# Patient Record
Sex: FEMALE | Race: WHITE | Employment: PART TIME
[De-identification: names, ages, dates, MRNs, and addresses within clinical notes are randomized per-mention and may not be internally consistent; named-entity substitution may affect disease eponyms.]

---
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
## 2019-06-01 ENCOUNTER — NURSE TRIAGE (OUTPATIENT)
Dept: OTHER | Age: 19
End: 2019-06-01

## 2019-06-01 NOTE — TELEPHONE ENCOUNTER
Pt. saw Dr. Belkis Mckeon in office yesterday and was ordered a z-pack, a nasal spray and an inhaler and zyrtec D. Mom states that none of the medication is at their pharmacy but Mom does not know what the nasal spray or inhaler are named. Information is with patient at work. Mom will try to obtain information and call back or have patient call back.      Reason for Disposition   Reason: professional judgment or information in Reference    Protocols used: NO GUIDELINE AVAILABLE-PEDIATRIC-

## 2019-06-01 NOTE — TELEPHONE ENCOUNTER
Mom returns call and asks that z-pack and zyrtec D be ordered for now. Mom was unable to reach child/patient to clarify all medication that was ordered yesterday and diagnosis. Mom states that she was told it had to do with the sinus' and wheezing and may possibly be a sinus infection. Writer speaks to Dr. Ysabel Jarrett who orders 1.) Zyrtec D one PO every day, dispense 30, 1 refill. 2.) Z-pack, dispense 1, use as directed, no refills. 3.) Flonase, 1 squirt each nare 2 times a day, dispense 1, 1 refill. 4.) ProAir Inhaler, 2 puffs Q4-6 hours as needed, dispense 1, 1 refill. Medication called to Casey Villalpando at [facility] on [facility] (ph# [phone]). Drea Huizar states that medication will be ready in 30 minutes. Writer then speaks to Zaida, and informs of all four medications that were ordered and that they will be ready for  in about 30 minutes.

## 2020-03-19 RX ORDER — NORETHINDRONE ACETATE AND ETHINYL ESTRADIOL 1MG-20(21)
KIT ORAL
Qty: 28 TABLET | Refills: 4 | OUTPATIENT
Start: 2020-03-19

## 2020-03-20 RX ORDER — NORETHINDRONE ACETATE AND ETHINYL ESTRADIOL 1MG-20(21)
KIT ORAL
Qty: 28 TABLET | Refills: 1 | Status: SHIPPED | OUTPATIENT
Start: 2020-03-20 | End: 2020-04-21 | Stop reason: SDUPTHER

## 2020-04-21 ENCOUNTER — OFFICE VISIT (OUTPATIENT)
Dept: OBGYN CLINIC | Age: 20
End: 2020-04-21
Payer: COMMERCIAL

## 2020-04-21 VITALS
BODY MASS INDEX: 18.32 KG/M2 | WEIGHT: 114 LBS | DIASTOLIC BLOOD PRESSURE: 72 MMHG | HEIGHT: 66 IN | SYSTOLIC BLOOD PRESSURE: 120 MMHG | TEMPERATURE: 97.6 F

## 2020-04-21 PROCEDURE — 99213 OFFICE O/P EST LOW 20 MIN: CPT | Performed by: NURSE PRACTITIONER

## 2020-04-21 RX ORDER — NORETHINDRONE ACETATE AND ETHINYL ESTRADIOL 1MG-20(21)
KIT ORAL
Qty: 28 TABLET | Refills: 5 | Status: SHIPPED | OUTPATIENT
Start: 2020-04-21 | End: 2020-12-21 | Stop reason: SDUPTHER

## 2020-04-21 NOTE — PROGRESS NOTES
week: Not on file     Minutes per session: Not on file    Stress: Not on file   Relationships    Social connections     Talks on phone: Not on file     Gets together: Not on file     Attends [employer] service: Not on file     Active member of club or organization: Not on file     Attends meetings of clubs or organizations: Not on file     Relationship status: Not on file    Intimate partner violence     Fear of current or ex partner: Not on file     Emotionally abused: Not on file     Physically abused: Not on file     Forced sexual activity: Not on file   Other Topics Concern    Not on file   Social History Narrative    Not on file         MEDICATIONS:  Current Outpatient Medications   Medication Sig Dispense Refill    norethindrone-ethinyl estradiol (BLISOVI FE 1/20) 1-20 MG-MCG per tablet TAKE ONE TABLET BY MOUTH DAILY 28 tablet 5    cetirizine-psuedoephedrine (ZYRTEC-D ALLERGY & CONGESTION) 5-120 MG per extended release tablet Take 1 tablet by mouth 2 times daily       No current facility-administered medications for this visit. ALLERGIES:  Allergies as of 04/21/2020 - Review Complete 04/21/2020   Allergen Reaction Noted    Seasonal  10/09/2014         REVIEW OF SYSTEMS:    yes   A minimum of an eleven point review of systems was completed. Review Of Systems (11 point):  Constitutional: No fever, chills or malaise;  No weight change or fatigue  Head and Eyes: No vision, Headache, Dizziness or trauma in last 12 months  ENT ROS: No hearing, Tinnitis, sinus or taste problems  Hematological and Lymphatic ROS:No Lymphoma, Von Willebrand's, Hemophillia or Bleeding History  Psych ROS: No Depression, Homicidal thoughts,suicidal thoughts, or anxiety  Breast ROS: No prior breast abnormalities or lumps. + bilateral breast lumps  Respiratory ROS: No SOB, Pneumoniae,Cough, or Pulmonary Embolism History  Cardiovascular ROS: No Chest Pain with Exertion, Palpitations, Syncope, Edema, Sensitive       <=4 SUSCEPTIBLEInterpretive criteria when Cefazolin results are used for the therapy of uncomplicated UTIs due to E. coli, K. pneumoniae, and P. mirabilis or to predict the potential effectiveness of oral cephalosporins (eg. Cephalexin) due to E. coli, K. pneumoniae, and P. mirabilis. meropenem Value in next row        <=4 SUSCEPTIBLEInterpretive criteria when Cefazolin results are used for the therapy of uncomplicated UTIs due to E. coli, K. pneumoniae, and P. mirabilis or to predict the potential effectiveness of oral cephalosporins (eg. Cephalexin) due to E. coli, K. pneumoniae, and P. mirabilis. nitrofurantoin Value in next row Sensitive       <=4 SUSCEPTIBLEInterpretive criteria when Cefazolin results are used for the therapy of uncomplicated UTIs due to E. coli, K. pneumoniae, and P. mirabilis or to predict the potential effectiveness of oral cephalosporins (eg. Cephalexin) due to E. coli, K. pneumoniae, and P. mirabilis. tigecycline Value in next row        <=4 SUSCEPTIBLEInterpretive criteria when Cefazolin results are used for the therapy of uncomplicated UTIs due to E. coli, K. pneumoniae, and P. mirabilis or to predict the potential effectiveness of oral cephalosporins (eg. Cephalexin) due to E. coli, K. pneumoniae, and P. mirabilis. tobramycin Value in next row Sensitive       <=4 SUSCEPTIBLEInterpretive criteria when Cefazolin results are used for the therapy of uncomplicated UTIs due to E. coli, K. pneumoniae, and P. mirabilis or to predict the potential effectiveness of oral cephalosporins (eg. Cephalexin) due to E. coli, K. pneumoniae, and P. mirabilis. trimethoprim-sulfamethoxazole Value in next row Sensitive       <=4 SUSCEPTIBLEInterpretive criteria when Cefazolin results are used for the therapy of uncomplicated UTIs due to E. coli, K. pneumoniae, and P. mirabilis or to predict the potential effectiveness of oral cephalosporins (eg.  Cephalexin) due to E. coli, risk of developing a blood clot which may increase her morbidity and or mortality. She was counseled on alternate non hormonal based contraception options. We discussed that smoking and any hormonal based contraception may increase the patients risks of developing these life threatening blood clots. All patients are encouraged to stop smoking at the time of contraceptive counseling. Cessation programs were reviewed. The patient was instructed to use barrier contraception for sexually transmitted disease prevention. The patient was also informed of antibiotics decreasing contraceptive efficacy and the need for barrier contraception from the onset of her antibiotic dosing and through a minimum of thirty days from antibiotic cessation. The life threatening side effect profile was reviewed in detail this includes but is not limited to shortness of breath, chest pain, severe or persistent headaches, or calf pain. If any of these occur the patient has been instructed to stop using her hormonal based contraception, notify the office, and go to the emergency department or call 911. The patient denied any personal history of blood clots in her leg, lung, or heart and denied any family history of stroke, TIA, sudden cardiac death < 36 y.o.,pulmonary embolism, or deep venous thrombosis. PLAN:  Return in about 3 months (around 7/13/2020) for annual.  Bilateral breast ultrasound ordered. Call for results. Patient denies any personal or family history of blood clots or sudden cardiac death prior to age 36. Signs hormonal birth control consent form  Refill for [facility] sent to pharmacy. Repeat Annual every 1 year  Cervical Cytology Evaluation begins at 24years old. If Negative Cytology, Follow-up screening per current guidelines. Return to the office in 12 weeks. Counseled on preventative health maintenance follow-up.   Orders Placed This Encounter   Procedures    US BREAST COMPLETE LEFT     Standing

## 2020-05-06 ENCOUNTER — TELEPHONE (OUTPATIENT)
Dept: OBGYN CLINIC | Age: 20
End: 2020-05-06

## 2020-05-06 DIAGNOSIS — N63.15 BREAST LUMP ON RIGHT SIDE AT 12 O'CLOCK POSITION: ICD-10-CM

## 2020-05-06 DIAGNOSIS — N63.25 BREAST LUMP ON LEFT SIDE AT 12 O'CLOCK POSITION: ICD-10-CM

## 2020-05-06 NOTE — TELEPHONE ENCOUNTER
----- Message from SEUN Chino NP sent at 5/6/2020 11:25 AM EDT -----  No abnormalities seen in either breast  Normal breast exams with annual exams recommended

## 2020-12-21 ENCOUNTER — OFFICE VISIT (OUTPATIENT)
Dept: OBGYN CLINIC | Age: 20
End: 2020-12-21
Payer: COMMERCIAL

## 2020-12-21 VITALS
DIASTOLIC BLOOD PRESSURE: 62 MMHG | SYSTOLIC BLOOD PRESSURE: 104 MMHG | WEIGHT: 117 LBS | TEMPERATURE: 98.2 F | HEIGHT: 66 IN | BODY MASS INDEX: 18.8 KG/M2

## 2020-12-21 PROCEDURE — 99395 PREV VISIT EST AGE 18-39: CPT | Performed by: NURSE PRACTITIONER

## 2020-12-21 RX ORDER — NORETHINDRONE ACETATE AND ETHINYL ESTRADIOL 1MG-20(21)
KIT ORAL
Qty: 28 TABLET | Refills: 12 | Status: SHIPPED | OUTPATIENT
Start: 2020-12-21 | End: 2022-04-07 | Stop reason: SDUPTHER

## 2020-12-21 ASSESSMENT — PATIENT HEALTH QUESTIONNAIRE - PHQ9
SUM OF ALL RESPONSES TO PHQ QUESTIONS 1-9: 0
2. FEELING DOWN, DEPRESSED OR HOPELESS: 0
SUM OF ALL RESPONSES TO PHQ9 QUESTIONS 1 & 2: 0
SUM OF ALL RESPONSES TO PHQ QUESTIONS 1-9: 0
1. LITTLE INTEREST OR PLEASURE IN DOING THINGS: 0
SUM OF ALL RESPONSES TO PHQ QUESTIONS 1-9: 0

## 2020-12-21 NOTE — PROGRESS NOTES
History and Physical  [address], [address] [facility] [address], [facility] [place] [address]. [phone]   Fax [phone]  2020              20 y.o. Chief Complaint   Patient presents with    Gynecologic Exam       No LMP recorded. Primary Care Physician: Best Weeks MD    The patient was seen and examined. She has no chief complaint today and is here for her annual exam.  Her bowels are regular. There are no voiding complaints. She denies any bloating. She denies vaginal discharge and was counseled on STD's and the need for barrier contraception. HPI : Gilma Das is a 23 y.o. female     Annual exam  Stopped OCPs in March. Desires to restart. After stopping OCPs, periods are longer and heavier- coming every month, lasting 4-5 days. 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   ________________________________________________________________________  [facility] History    Para Term  AB Living   0 0 0 0 0 0   SAB TAB Ectopic Molar Multiple Live Births   0 0 0 0 0 0     Past Medical History:   Diagnosis Date    Cancer ([facility] [address]     PAROTID    Exercise-induced asthma     Family history of breast cancer     mgreat aunt    Seasonal allergies                                                                    Past Surgical History:   Procedure Laterality Date    NOSE SURGERY  2019    PAROTIDECTOMY Left 10/9/2014    WISDOM TOOTH EXTRACTION      WISDOM TOOTH EXTRACTION       Family History   Problem Relation Age of Onset    Coronary Art Dis Other         G. Parents    Alcohol Abuse Other         uncle, G. Parent    Heart Disease Paternal Grandfather      Social History     Socioeconomic History    Marital status: Single     Spouse name: Not on file    Number of children: Not on file    Years of education: Not on file    Highest education level: Not on file   Occupational History    Not on file   Social Needs    Financial resource strain: Not on file    Food insecurity     Worry: Not on file     Inability: Not on file    Transportation needs     Medical: Not on file     Non-medical: Not on file   Tobacco Use    Smoking status: Never Smoker    Smokeless tobacco: Never Used   Substance and Sexual Activity    Alcohol use: No    Drug use: No    Sexual activity: Yes     Partners: Male   Lifestyle    Physical activity     Days per week: Not on file     Minutes per session: Not on file    Stress: Not on file   Relationships    Social connections     Talks on phone: Not on file     Gets together: Not on file     Attends Zoroastrianism service: Not on file     Active member of club or organization: Not on file     Attends meetings of clubs or organizations: Not on file     Relationship status: Not on file    Intimate partner violence     Fear of current or ex partner: Not on file     Emotionally abused: Not on file     Physically abused: Not on file     Forced sexual activity: Not on file   Other Topics Concern    Not on file   Social History Narrative    Not on file       MEDICATIONS:  Current Outpatient Medications   Medication Sig Dispense Refill    norethindrone-ethinyl estradiol (BLISOVI FE 1/20) 1-20 MG-MCG per tablet TAKE ONE TABLET BY MOUTH DAILY 28 tablet 12    cetirizine-psuedoephedrine (ZYRTEC-D ALLERGY & CONGESTION) 5-120 MG per extended release tablet Take 1 tablet by mouth 2 times daily       No current facility-administered medications for this visit. ALLERGIES:  Allergies as of 12/21/2020 - Review Complete 12/21/2020   Allergen Reaction Noted    Seasonal  10/09/2014       Symptoms of decreased mood absent  Symptoms of anhedonia absent    **If either question is answered in a  positive fashion then complete the PHQ9 Scoring Evaluation and make the appropriate referral**      Immunization status: stated as current, but no records available.       Gynecologic History:  Menarche: 16yo  Menopause at [age] yo     No LMP recorded. Sexually Active: Yes    STD History: No     Permanent Sterilization: No   Reversible Birth Control: No        Hormone Replacement Exposure: No      Genetic Qualified Family History of Breast, Ovarian , Colon or Uterine Cancer: No     If YES see scanned worksheet. Preventative Health Testing:    Health Maintenance:  Health Maintenance Due   Topic Date Due    Varicella vaccine (1 of 2 - 2-dose childhood series) 01/02/2000    HPV vaccine (1 - 2-dose series) 01/02/2010    HIV screen  01/02/2014    DTaP/Tdap/Td vaccine (1 - Tdap) 01/02/2018    Chlamydia screen  10/15/2019    Cervical cancer screen  01/02/2020    Flu vaccine (1) 09/01/2020       Date of Last Pap Smear: NA  Abnormal Pap Smear History: NA  Colposcopy History:   Date of Last Mammogram: NA  Date of Last Colonoscopy:   Date of Last Bone Density:      ________________________________________________________________________        REVIEW OF SYSTEMS:    yes   A minimum of an eleven point review of systems was completed. Review Of Systems (11 point):  Constitutional: No fever, chills or malaise; No weight change or fatigue  Head and Eyes: No vision, Headache, Dizziness or trauma in last 12 months  ENT ROS: No hearing, Tinnitis, sinus or taste problems  Hematological and Lymphatic ROS:No Lymphoma, Von Willebrand's, Hemophillia or Bleeding History  Psych ROS: No Depression, Homicidal thoughts,suicidal thoughts, or anxiety  Breast ROS: No prior breast abnormalities or lumps  Respiratory ROS: No SOB, Pneumoniae,Cough, or Pulmonary Embolism History  Cardiovascular ROS: No Chest Pain with Exertion, Palpitations, Syncope, Edema, Arrhythmia  Gastrointestinal ROS: No Indigestion, Heartburn, Nausea, vomiting, Diarrhea, Constipation,or Bowel Changes; No Bloody Stools or melena  Genito-Urinary ROS: No Dysuria, Hematuria or Nocturia.  No Urinary Incontinence or Vaginal Discharge  Musculoskeletal ROS: No Arthralgia, Arthritis,Gout,Osteoporosis or Rheumatism  Neurological ROS: No CVA, Migraines, Epilepsy, Seizure Hx, or Limb Weakness  Dermatological ROS: No Rash, Itching, Hives, Mole Changes or Cancer                                                                                                                                                                                                                                  PHYSICAL Exam:     Constitutional:  Vitals:    12/21/20 1259   BP: 104/62   Site: Right Upper Arm   Position: Sitting   Cuff Size: Medium Adult   Temp: 98.2 °F (36.8 °C)   Weight: 117 lb (53.1 kg)   Height: 5' 6\" (1.676 m)       Chaperone for Intimate Exam   Chaperone was offered and accepted as part of the rooming process.  Chaperone: Daralyn Barthel          General Appearance: This  is a well Developed, well Nourished, well groomed female. Her BMI was reviewed. Nutritional decision making was discussed. Skin:  There was a Normal Inspection of the skin without rashes or lesions. There were no rashes. (Papular, Maculopapular, Hives, Pustular, Macular)     There were no lesions (Ulcers, Erythema, Abn. Appearing Nevi)            Lymphatic:  No Lymph Nodes were Palpable in the neck , axilla or groin.  0 # Of Lymph Nodes; Location ; Character [Normal]  [Shotty] [Tender] [Enlarged]     Neck and EENT:  The neck was supple. There were no masses   The thyroid was not enlarged and had no masses. Perrla, EOMI B/L, TMI B/L No Abnormalities. Throat inspected-No exudates or Masses, Nares Patent No Masses        Respiratory: The lungs were auscultated and found to be clear. There were no rales, rhonchi or wheezes. There was a good respiratory effort. Cardiovascular: The heart was in a regular rate and rhythm. . No S3 or S4. There was no murmur appreciated. Location, grade, and radiation are not applicable. Extremities:   The patients extremities were without calf tenderness, edema, or recommendations. 1. Annuals every year; Cytology collections per prevailing guidelines. 2. Mammograms begin every year at 35 yo if no abnormalities are found and no family history. 3. Bone density studies every 2-3 years. Begin at 71 yo. If no fracture history or osteoporosis family history. (significant). 4. Colonoscopy begin at 40 yo. Repeat every ten years if negative and no family history. 5. Calcium of 2062-5667 mg/day in split dosing  6. Vitamin D 400-800 IU/day  7. All other preventative health recommendations will be managed by the patients Primary care physician. Counseling Hormonal Based Birth Control:      The patient was seen and counseled on all forms of birth control both male and female  reversible and non. She is aware that hormonal based birth control may increase her risk of developing a blood clot which may increase her morbidity and or mortality. She was counseled on alternate non hormonal based contraception options. We discussed that smoking and any hormonal based contraception may increase the patients risks of developing these life threatening blood clots. All patients are encouraged to stop smoking at the time of contraceptive counseling. Cessation programs were reviewed. The patient was instructed to use barrier contraception for sexually transmitted disease prevention. The patient was also informed of antibiotics decreasing contraceptive efficacy and the need for barrier contraception from the onset of her antibiotic dosing and through a minimum of thirty days from antibiotic cessation. The life threatening side effect profile was reviewed in detail this includes but is not limited to shortness of breath, chest pain, severe or persistent headaches, or calf pain. If any of these occur the patient has been instructed to stop using her hormonal based contraception, notify the office, and go to the emergency department or call 911.     The patient denied any personal history of blood clots in her leg, lung, or heart and denied any family history of stroke, TIA, sudden cardiac death < 36 y.o.,pulmonary embolism, or deep venous thrombosis. PLAN:  Return in about 1 year (around 12/21/2021) for annual.   Pap smear collected. Patient denies any personal or family history of blood clots or sudden cardiac death prior to age 36. Signs hormonal birth control consent form  Prescription for blisovi 1/20 sent to pharmacy. Repeat Annual every 1 year  Cervical Cytology Evaluation begins at 24years old. If Negative Cytology, Follow-up screening per current guidelines. Mammograms every 1 year. If 37 yo and last mammogram was negative. Calcium and Vitamin D dosing reviewed. Colonoscopy screening reviewed as well as onset for bone density testing. Birth control and barrier recommendations discussed. STD counseling and prevention reviewed. Gardisil counseling completed for all patients 10-37 yo. Routine health maintenance per patients PCP. Orders Placed This Encounter   Procedures    PAP SMEAR     Standing Status:   Future     Standing Expiration Date:   12/17/2021     Order Specific Question:   Collection Type     Answer: Thin Prep     Order Specific Question:   Prior Abnormal Pap Test     Answer:   No     Order Specific Question:   Screening or Diagnostic     Answer:   Screening     Order Specific Question:   HPV Requested?      Answer:   N/A     Order Specific Question:   High Risk Patient     Answer:   N/A

## 2020-12-28 DIAGNOSIS — Z01.419 VISIT FOR GYNECOLOGIC EXAMINATION: ICD-10-CM

## 2022-01-19 ENCOUNTER — OFFICE VISIT (OUTPATIENT)
Dept: OBGYN CLINIC | Age: 22
End: 2022-01-19
Payer: COMMERCIAL

## 2022-01-19 VITALS
SYSTOLIC BLOOD PRESSURE: 94 MMHG | DIASTOLIC BLOOD PRESSURE: 62 MMHG | BODY MASS INDEX: 19.13 KG/M2 | WEIGHT: 119 LBS | HEIGHT: 66 IN

## 2022-01-19 DIAGNOSIS — Z11.3 SCREENING EXAMINATION FOR STD (SEXUALLY TRANSMITTED DISEASE): ICD-10-CM

## 2022-01-19 DIAGNOSIS — Z01.419 VISIT FOR GYNECOLOGIC EXAMINATION: Primary | ICD-10-CM

## 2022-01-19 PROCEDURE — 99395 PREV VISIT EST AGE 18-39: CPT | Performed by: NURSE PRACTITIONER

## 2022-01-19 ASSESSMENT — PATIENT HEALTH QUESTIONNAIRE - PHQ9
SUM OF ALL RESPONSES TO PHQ QUESTIONS 1-9: 0
SUM OF ALL RESPONSES TO PHQ9 QUESTIONS 1 & 2: 0
1. LITTLE INTEREST OR PLEASURE IN DOING THINGS: 0
SUM OF ALL RESPONSES TO PHQ QUESTIONS 1-9: 0
2. FEELING DOWN, DEPRESSED OR HOPELESS: 0

## 2022-01-19 NOTE — PROGRESS NOTES
History and Physical  [address], [address] [facility] [address], [address] [phone]   Fax [phone]  Chelly Bennett  [birth year]22              22 y.o. Chief Complaint   Patient presents with    Gynecologic Exam       Patient's last menstrual period was 2022 (approximate). Primary Care Physician: Ludy Ge MD    The patient was seen and examined. She has no chief complaint today and is here for her annual exam.  Her bowels are regular. There are no voiding complaints. She denies any bloating. She denies vaginal discharge and was counseled on STD's and the need for barrier contraception. HPI : Chelly Bennett is a 20 y.o. female     Annual exam  Stopped blisovi- no longer has a boyfriend. Wants to be off for now, but will call if she changes her mind. Periods occur every month, lasting 3-4 days.   STD screening.  ________________________________________________________________________  OB History    Para Term  AB Living   0 0 0 0 0 0   SAB IAB Ectopic Molar Multiple Live Births   0 0 0 0 0 0     Past Medical History:   Diagnosis Date    Cancer ([facility] [address]     PAROTID    Exercise-induced asthma     Family history of breast cancer     mgreat aunt    Seasonal allergies                                                                    Past Surgical History:   Procedure Laterality Date    NOSE SURGERY  2019    PAROTIDECTOMY Left 10/9/2014    WISDOM TOOTH EXTRACTION      WISDOM TOOTH EXTRACTION       Family History   Problem Relation Age of Onset    Coronary Art Dis Other         G. Parents    Alcohol Abuse Other         uncle, G. Parent    Heart Disease Paternal Grandfather      Social History     Socioeconomic History    Marital status: Single     Spouse name: Not on file    Number of children: Not on file    Years of education: Not on file    Highest education level: Not on file   Occupational History    Not on file   Tobacco Use    Smoking status: Never Smoker    Smokeless tobacco: Never Used   Substance and Sexual Activity    Alcohol use: No    Drug use: No    Sexual activity: Yes     Partners: Male   Other Topics Concern    Not on file   Social History Narrative    Not on file     Social Determinants of Health     Financial Resource Strain:     Difficulty of Paying Living Expenses: Not on file   Food Insecurity:     Worried About Running Out of Food in the Last Year: Not on file    Jinny of Food in the Last Year: Not on file   Transportation Needs:     Lack of Transportation (Medical): Not on file    Lack of Transportation (Non-Medical):  Not on file   Physical Activity:     Days of Exercise per Week: Not on file    Minutes of Exercise per Session: Not on file   Stress:     Feeling of Stress : Not on file   Social Connections:     Frequency of Communication with Friends and Family: Not on file    Frequency of Social Gatherings with Friends and Family: Not on file    Attends Rastafari Services: Not on file    Active Member of [address] [place] [address] or Organizations: Not on file    Attends Club or Organization Meetings: Not on file    Marital Status: Not on file   Intimate Partner Violence:     Fear of Current or Ex-Partner: Not on file    Emotionally Abused: Not on file    Physically Abused: Not on file    Sexually Abused: Not on file   Housing Stability:     Unable to Pay for Housing in the Last Year: Not on file    Number of [place] in the Last Year: Not on file    Unstable Housing in the Last Year: Not on file       MEDICATIONS:  Current Outpatient Medications   Medication Sig Dispense Refill    cetirizine-psuedoephedrine (ZYRTEC-D ALLERGY & CONGESTION) 5-120 MG per extended release tablet Take 1 tablet by mouth 2 times daily       norethindrone-ethinyl estradiol (BLISOVI FE 1/20) 1-20 MG-MCG per tablet TAKE ONE TABLET BY MOUTH DAILY (Patient not taking: Reported on 1/19/2022) 28 tablet 12     No current facility-administered medications for this visit. ALLERGIES:  Allergies as of 01/19/2022 - Fully Reviewed 01/19/2022   Allergen Reaction Noted    Seasonal  10/09/2014       Symptoms of decreased mood absent  Symptoms of anhedonia absent    **If either question is answered in a  positive fashion then complete the PHQ9 Scoring Evaluation and make the appropriate referral**      Immunization status: stated as current, but no records available. Gynecologic History:  Menarche: 4 yo  Menopause at [address] [place] [place] yo     Patient's last menstrual period was 01/12/2022 (approximate). Sexually Active: Yes    STD History: No     Permanent Sterilization: No   Reversible Birth Control: Yes OCPs        Hormone Replacement Exposure: No      Genetic Qualified Family History of Breast, Ovarian , Colon or Uterine Cancer: No     If YES see scanned worksheet. Preventative Health Testing:    Health Maintenance:  Health Maintenance Due   Topic Date Due    Hepatitis C screen  Never done    Varicella vaccine (1 of 2 - 2-dose childhood series) Never done    COVID-19 Vaccine (1) Never done    HPV vaccine (1 - 2-dose series) Never done    Depression Screen  Never done    HIV screen  Never done    DTaP/Tdap/Td vaccine (1 - Tdap) Never done    Chlamydia screen  10/15/2019    Flu vaccine (1) 09/01/2021       Date of Last Pap Smear: 12/28/2020 neg  Abnormal Pap Smear History: denies  Colposcopy History:   Date of Last Mammogram: NA  Date of Last Colonoscopy:   Date of Last Bone Density:      ________________________________________________________________________        REVIEW OF SYSTEMS:    yes   A minimum of an eleven point review of systems was completed. Review Of Systems (11 point):  Constitutional: No fever, chills or malaise;  No weight change or fatigue  Head and Eyes: No vision changes, Headache, Dizziness or trauma in last 12 months  ENT ROS: No hearing, Tinnitis, sinus or taste problems  Hematological and Lymphatic ROS:No Lymphoma, Von Willebrand's, Hemophillia or Bleeding History  Psych ROS: No Depression, Homicidal thoughts,suicidal thoughts, or anxiety  Breast ROS: No breast abnormalities or lumps  Respiratory ROS: No SOB, Pneumoniae,Cough, or Pulmonary Embolism   Cardiovascular ROS: No Chest Pain with Exertion, Palpitations, Syncope, Edema, Arrhythmia  Gastrointestinal ROS: No Indigestion, Heartburn, Nausea, vomiting, Diarrhea, Constipation,or Bowel Changes; No Bloody Stools or melena  Genito-Urinary ROS: No Dysuria, Hematuria or Nocturia. No Urinary Incontinence or Vaginal Discharge  Musculoskeletal ROS: No Arthralgia, Arthritis,Gout,Osteoporosis or Rheumatism  Neurological ROS: No CVA, Migraines, Epilepsy, Seizure Hx, or Limb Weakness  Dermatological ROS: No Rash, Itching, Hives, Mole Changes or Cancer                                                                                                                                                                                                                                  PHYSICAL Exam:     Constitutional:  Vitals:    01/19/22 1408   BP: 94/62   Site: Right Upper Arm   Position: Sitting   Cuff Size: Medium Adult   Weight: 119 lb (54 kg)   Height: 5' 6\" (1.676 m)       Chaperone for Intimate Exam   Chaperone was offered and accepted as part of the rooming process.  Chaperone: Jose          General Appearance: This  is a well Developed, well Nourished, well groomed female. Her BMI was reviewed. Nutritional decision making was discussed. Skin:  There was a Normal Inspection of the skin without rashes or lesions. There were no rashes. (Papular, Maculopapular, Hives, Pustular, Macular)     There were no lesions (Ulcers, Erythema, Abn. Appearing Nevi)            Lymphatic:  No Lymph Nodes were Palpable in the neck , axilla or groin.  0 # Of Lymph Nodes; Location ; Character [Normal]  [Shotty] [Tender] [Enlarged]     Neck and EENT:  The neck was supple. There were no masses   The thyroid was not enlarged and had no masses. Perrla, EOMI B/L, TMI B/L No Abnormalities. Throat inspected-No exudates or Masses, Nares Patent No Masses        Respiratory: The lungs were auscultated and found to be clear. There were no rales, rhonchi or wheezes. There was a good respiratory effort. Cardiovascular: The heart was in a regular rate and rhythm. . No S3 or S4. There was no murmur appreciated. Location, grade, and radiation are not applicable. Extremities: The patients extremities were without calf tenderness, edema, or varicosities. There was full range of motion in all four extremities. Pulses in all four extremities were appreciated and are 2/4. Abdomen: The abdomen was soft and non-tender. There were good bowel sounds in all quadrants and there was no guarding, rebound or rigidity. On evaluation there was no evidence of hepatosplenomegaly and there was no costal vertebral ash tenderness bilaterally. No hernias were appreciated. Abdominal Scars: intact    Psych: The patient had a normal Orientation to: Time, Place, Person, and Situation  There is no Mood / Affect changes    Breast:  (Chest)  normal appearance, no masses or tenderness  Self breast exams were reviewed in detail. Literature was given. Pelvic Exam:  External genitalia: normal general appearance  Urinary system: urethral meatus normal  Vaginal: normal mucosa without prolapse or lesions  Cervix: normal appearance  Adnexa: normal bimanual exam  Uterus: normal single, nontender    Rectal Exam:  exam declined by patient          Musculosk:  Normal Gait and station was noted. Digits were evaluated without abnormal findings. Range of motion, stability and strength were evaluated and found to be appropriate for the patients age. ASSESSMENT:      20 y.o. Annual   Diagnosis Orders   1. Visit for gynecologic examination  PAP SMEAR   2.  Screening examination for STD (sexually transmitted disease)  C.trachomatis N.gonorrhoeae DNA    VAGINITIS DNA PROBE    Hepatitis C Antibody    Hepatitis B Surface Antigen    HIV Screen    T. pallidum Ab    HERPES PROFILE          Chief Complaint   Patient presents with    Gynecologic Exam          Past Medical History:   Diagnosis Date    Cancer ([address]     PAROTID    Exercise-induced asthma     Family history of breast cancer     mgreat aunt    Seasonal allergies          Patient Active Problem List    Diagnosis Date Noted    Family history of breast cancer      mgreat aunt            Hereditary Breast, Ovarian, Colon and Uterine Cancer screening Done. Tobacco & Secondary smoke risks reviewed; instructed on cessation and avoidance      Counseling Completed:  Preventative Health Recommendations and Follow up. The patient was informed of the recommended preventative health recommendations. 1. Annuals every year; Cytology collections per prevailing guidelines. 2. Mammograms begin every year at 37 yo if no abnormalities are found and no family history. 3. Bone density studies every 2-3 years. Begin at 73 yo. If no fracture history or osteoporosis family history. (significant). 4. Colonoscopy begin at 40 yo. Repeat every ten years if negative and no family history. 5. Calcium of 8152-7705 mg/day in split dosing  6. Vitamin D 400-800 IU/day  7. All other preventative health recommendations will be managed by the patients Primary care physician. Counseling Hormonal Based Birth Control:      The patient was seen and counseled on all forms of birth control both male and female  reversible and non. She is aware that hormonal based birth control may increase her risk of developing a blood clot which may increase her morbidity and or mortality. She was counseled on alternate non hormonal based contraception options.   We discussed that smoking and any hormonal based contraception may increase the patients risks of developing these life threatening blood clots. All patients are encouraged to stop smoking at the time of contraceptive counseling. Cessation programs were reviewed. The patient was instructed to use barrier contraception for sexually transmitted disease prevention. The patient was also informed of antibiotics decreasing contraceptive efficacy and the need for barrier contraception from the onset of her antibiotic dosing and through a minimum of thirty days from antibiotic cessation. The life threatening side effect profile was reviewed in detail this includes but is not limited to shortness of breath, chest pain, severe or persistent headaches, or calf pain. If any of these occur the patient has been instructed to stop using her hormonal based contraception, notify the office, and go to the emergency department or call 911. The patient denied any personal history of blood clots in her leg, lung, or heart and denied any family history of stroke, TIA, sudden cardiac death < 36 y.o.,pulmonary embolism, or deep venous thrombosis. PLAN:  Return in about 1 year (around 1/19/2023) for annual.   Pap smear and vaginal cultures collected. Lab slips given for STD screening. Signs hormone consent form. Declines refill on Blisovi currently, will call office if she changes her mind. Repeat Annual every 1 year  Cervical Cytology Evaluation begins at 24years old. If Negative Cytology, Follow-up screening per current guidelines. Mammograms every 1 year. If 35 yo and last mammogram was negative. Calcium and Vitamin D dosing reviewed. Colonoscopy screening reviewed as well as onset for bone density testing. Birth control and barrier recommendations discussed. STD counseling and prevention reviewed. Gardisil counseling completed for all patients 10-35 yo. Routine health maintenance per patients PCP.   Orders Placed This Encounter   Procedures    C.trachomatis N.gonorrhoeae DNA     Standing Status:   Future     Standing Expiration Date: 1/19/2023    VAGINITIS DNA PROBE     Standing Status:   Future     Standing Expiration Date:   1/19/2023    PAP SMEAR     Patient History:    Patient's last menstrual period was 01/12/2022 (approximate). OBGYN Status: Having periods  Past Surgical History:  12/2019: NOSE SURGERY  10/9/2014: Chinyere Come; Left  No date: WISDOM TOOTH EXTRACTION  No date: WISDOM TOOTH EXTRACTION  Medications/Contraceptives Affecting Cytology     Combination Contraceptives - Oral Disp Start End     norethindrone-ethinyl estradiol (BLISOVI FE 1/20) 1-20 MG-MCG per tablet      28 tablet 12/21/2020     Sig: TAKE ONE TABLET BY MOUTH DAILY    Patient not taking: Reported on 1/19/2022       Notes to Pharmacy: Patient will need to call the office to get an annual   exam scheduled. Social History    Tobacco Use      Smoking status: Never Smoker      Smokeless tobacco: Never Used       Standing Status:   Future     Standing Expiration Date:   1/19/2023     Order Specific Question:   Collection Type     Answer: Thin Prep     Order Specific Question:   Prior Abnormal Pap Test     Answer:   No     Order Specific Question:   Screening or Diagnostic     Answer:   Screening     Order Specific Question:   HPV Requested? Answer:   N/A     Order Specific Question:   High Risk Patient     Answer:   N/A    Hepatitis C Antibody     Standing Status:   Future     Standing Expiration Date:   1/19/2023    Hepatitis B Surface Antigen     Standing Status:   Future     Standing Expiration Date:   1/19/2023    HIV Screen     Standing Status:   Future     Standing Expiration Date:   1/19/2023    T. pallidum Ab     Standing Status:   Future     Standing Expiration Date:   2/18/2022    HERPES PROFILE     Standing Status:   Future     Standing Expiration Date:   2/18/2022           The patient, Christin Perrin is a 20 y.o. female, was seen with a total time spent of 20 minutes for the visit on this date of service by the E/M provider.  The time component had both face to face and non face to face time spent in determining the total time component. Counseling and education regarding her diagnosis listed below and her options regarding those diagnoses were also included in determining her time component. Diagnosis Orders   1. Visit for gynecologic examination  PAP SMEAR   2. Screening examination for STD (sexually transmitted disease)  C.trachomatis N.gonorrhoeae DNA    VAGINITIS DNA PROBE    Hepatitis C Antibody    Hepatitis B Surface Antigen    HIV Screen    T. pallidum Ab    HERPES PROFILE        The patient had her preventative health maintenance recommendations and follow-up reviewed with her at the completion of her visit.

## 2022-01-20 DIAGNOSIS — Z11.3 SCREENING EXAMINATION FOR STD (SEXUALLY TRANSMITTED DISEASE): ICD-10-CM

## 2022-01-21 ENCOUNTER — TELEPHONE (OUTPATIENT)
Dept: OBGYN CLINIC | Age: 22
End: 2022-01-21

## 2022-01-21 RX ORDER — FLUCONAZOLE 150 MG/1
150 TABLET ORAL ONCE
Qty: 2 TABLET | Refills: 0 | Status: SHIPPED | OUTPATIENT
Start: 2022-01-21 | End: 2022-01-21

## 2022-01-21 RX ORDER — METRONIDAZOLE 500 MG/1
500 TABLET ORAL 2 TIMES DAILY
Qty: 14 TABLET | Refills: 0 | Status: SHIPPED | OUTPATIENT
Start: 2022-01-21 | End: 2022-01-28

## 2022-01-21 NOTE — TELEPHONE ENCOUNTER
----- Message from SEUN iGraldo NP sent at 1/20/2022  4:47 PM EST -----  + BV  Flagyl 500mg PO BID x 7 days   + yeast  Diflucan 150 mg PO x 1  repeat in 7 days

## 2022-01-25 ENCOUNTER — TELEPHONE (OUTPATIENT)
Dept: OBGYN CLINIC | Age: 22
End: 2022-01-25

## 2022-01-25 DIAGNOSIS — Z01.419 VISIT FOR GYNECOLOGIC EXAMINATION: ICD-10-CM

## 2022-01-25 NOTE — TELEPHONE ENCOUNTER
----- Message from SEUN Bee - CNP sent at 1/25/2022  9:29 AM EST -----  Pap smear ASCUS  AGE 22  Repeat in 1 year  Please let patient know results. + candida- diflucan 150mg PO X 1 now and repeat in 7 days  Can you please check on the GC/ chlamydia? I am not seeing the results.

## 2022-01-25 NOTE — TELEPHONE ENCOUNTER
Per Benson Alfred NP, pt notified of ASCUS on pap with recommendation to repeat in 1yr. Pt completed diflucan and is still taking flagyl for +BV. Pt verbalized understanding.

## 2022-01-27 DIAGNOSIS — Z11.3 SCREENING EXAMINATION FOR STD (SEXUALLY TRANSMITTED DISEASE): ICD-10-CM

## 2022-04-07 DIAGNOSIS — N94.6 DYSMENORRHEA: ICD-10-CM

## 2022-04-07 RX ORDER — NORETHINDRONE ACETATE AND ETHINYL ESTRADIOL 1MG-20(21)
KIT ORAL
Qty: 28 TABLET | Refills: 12 | Status: SHIPPED | OUTPATIENT
Start: 2022-04-07

## 2022-04-07 RX ORDER — NORETHINDRONE ACETATE AND ETHINYL ESTRADIOL 1MG-20(21)
KIT ORAL
Qty: 28 TABLET | Refills: 12 | Status: CANCELLED | OUTPATIENT
Start: 2022-04-07

## 2022-04-07 NOTE — TELEPHONE ENCOUNTER
Pt called in she saw nenita in Jan. Pt had declined bc fill at time but was told if she decided to start it to call office , pt wants to start now .  Please call into [facility]

## 2022-12-14 ENCOUNTER — TELEPHONE (OUTPATIENT)
Dept: OBGYN CLINIC | Age: 22
End: 2022-12-14

## 2022-12-14 RX ORDER — FLUCONAZOLE 150 MG/1
150 TABLET ORAL ONCE
Qty: 2 TABLET | Refills: 0 | Status: SHIPPED | OUTPATIENT
Start: 2022-12-14 | End: 2022-12-14

## 2022-12-14 NOTE — TELEPHONE ENCOUNTER
Pt called w symptoms of yeast , she did buy stuff OTC but she thinks now she needs a pill , sh eis update on Annual she had it inJ an 2021 , please call into [address]

## 2022-12-21 ENCOUNTER — OFFICE VISIT (OUTPATIENT)
Dept: OBGYN CLINIC | Age: 22
End: 2022-12-21
Payer: COMMERCIAL

## 2022-12-21 VITALS — DIASTOLIC BLOOD PRESSURE: 64 MMHG | HEIGHT: 67 IN | SYSTOLIC BLOOD PRESSURE: 108 MMHG | BODY MASS INDEX: 18.64 KG/M2

## 2022-12-21 DIAGNOSIS — N94.6 DYSMENORRHEA: ICD-10-CM

## 2022-12-21 DIAGNOSIS — N89.8 VAGINAL DISCHARGE: Primary | ICD-10-CM

## 2022-12-21 PROBLEM — C80.1 MALIGNANT NEOPLASM (HCC): Status: ACTIVE | Noted: 2022-04-06

## 2022-12-21 PROBLEM — F41.1 GENERALIZED ANXIETY DISORDER: Status: ACTIVE | Noted: 2022-04-05

## 2022-12-21 PROBLEM — E55.9 VITAMIN D DEFICIENCY: Status: ACTIVE | Noted: 2022-04-06

## 2022-12-21 PROBLEM — E61.1 IRON DEFICIENCY: Status: ACTIVE | Noted: 2022-04-12

## 2022-12-21 PROBLEM — K90.9 IRON MALABSORPTION: Status: ACTIVE | Noted: 2022-04-12

## 2022-12-21 PROCEDURE — 99213 OFFICE O/P EST LOW 20 MIN: CPT | Performed by: NURSE PRACTITIONER

## 2022-12-21 RX ORDER — PANTOPRAZOLE SODIUM 20 MG/1
20 TABLET, DELAYED RELEASE ORAL DAILY
COMMUNITY
Start: 2022-11-14

## 2022-12-21 RX ORDER — LEVOCETIRIZINE DIHYDROCHLORIDE 5 MG/1
5 TABLET, FILM COATED ORAL DAILY
COMMUNITY
Start: 2022-09-08

## 2022-12-21 RX ORDER — NORETHINDRONE ACETATE AND ETHINYL ESTRADIOL 1MG-20(21)
KIT ORAL
Qty: 28 TABLET | Refills: 2 | Status: SHIPPED | OUTPATIENT
Start: 2022-12-21

## 2022-12-21 RX ORDER — BUSPIRONE HYDROCHLORIDE 7.5 MG/1
7.5 TABLET ORAL 2 TIMES DAILY
COMMUNITY
Start: 2022-11-28

## 2022-12-21 RX ORDER — FLUCONAZOLE 150 MG/1
150 TABLET ORAL ONCE
Qty: 1 TABLET | Refills: 0 | Status: SHIPPED | OUTPATIENT
Start: 2022-12-21 | End: 2022-12-21

## 2022-12-21 NOTE — PROGRESS NOTES
Mila Pallas  2022    YOB: 2000          The patient was seen today. She is here regarding states she restarted her blisovi on her own a couple months after her annual as she already had refills. Did not have a menses from may to September on OCP. Desires to continue on OCP. States she would like an menses every month now that she is sexually active again but feels stable on this OCP. Desires ot continue on OCP at this time. Recently txted for yeast by urgent care- deres x 1 more dose of diflucan. Her bowels are regular and she is voiding without difficulty.      HPI:  Mila Pallas is a 20 y.o. female  dysmenorrhea and vaginal irritation       OB History    Para Term  AB Living   0 0 0 0 0 0   SAB IAB Ectopic Molar Multiple Live Births   0 0 0 0 0 0       Past Medical History:   Diagnosis Date    Cancer ([facility] [address]     PAROTID    Exercise-induced asthma     Family history of breast cancer     mgreat aunt    Generalized anxiety disorder 2022    Seasonal allergies        Past Surgical History:   Procedure Laterality Date    NOSE SURGERY  2019    PAROTIDECTOMY Left 10/9/2014    WISDOM TOOTH EXTRACTION      WISDOM TOOTH EXTRACTION         Family History   Problem Relation Age of Onset    Coronary Art Dis Other         G. Parents    Alcohol Abuse Other         uncle, G. Parent    Heart Disease Paternal Grandfather        Social History     Socioeconomic History    Marital status: Single     Spouse name: Not on file    Number of children: Not on file    Years of education: Not on file    Highest education level: Not on file   Occupational History    Not on file   Tobacco Use    Smoking status: Never    Smokeless tobacco: Never   Substance and Sexual Activity    Alcohol use: No    Drug use: No    Sexual activity: Yes     Partners: Male   Other Topics Concern    Not on file   Social History Narrative    Not on file     Social Determinants of Health     Financial Resource Strain: Not on file   Food Insecurity: Not on file   Transportation Needs: Not on file   Physical Activity: Not on file   Stress: Not on file   Social Connections: Not on file   Intimate Partner Violence: Not on file   Housing Stability: Not on file         MEDICATIONS:  Current Outpatient Medications   Medication Sig Dispense Refill    busPIRone (BUSPAR) 7.5 MG tablet Take 7.5 mg by mouth 2 times daily      Cholecalciferol 50 MCG (2000 UT) CAPS Take 2,000 Units by mouth daily      levocetirizine (XYZAL) 5 MG tablet Take 5 mg by mouth daily      pantoprazole (PROTONIX) 20 MG tablet Take 20 mg by mouth daily      norethindrone-ethinyl estradiol (BLISOVI FE 1/20) 1-20 MG-MCG per tablet TAKE ONE TABLET BY MOUTH DAILY 28 tablet 2    fluconazole (DIFLUCAN) 150 MG tablet Take 1 tablet by mouth once for 1 dose 1 tablet 0    cetirizine-psuedoephedrine (ZYRTEC-D) 5-120 MG per extended release tablet Take 1 tablet by mouth 2 times daily        No current facility-administered medications for this visit. ALLERGIES:  Allergies as of 12/21/2022 - Fully Reviewed 01/19/2022   Allergen Reaction Noted    Seasonal  10/09/2014         REVIEW OF SYSTEMS:    see problem list   A minimum of an eleven point review of systems was completed. Review Of Systems (11 point):  Constitutional: No fever, chills or malaise;  No weight change or fatigue  Head and Eyes: No vision, Headache, Dizziness or trauma in last 12 months  ENT ROS: No hearing, Tinnitis, sinus or taste problems  Hematological and Lymphatic ROS:No Lymphoma, Von Willebrand's, Hemophillia or Bleeding History  Psych ROS: No Depression, Homicidal thoughts,suicidal thoughts, or anxiety  Breast ROS: No prior breast abnormalities or lumps  Respiratory ROS: No SOB, Pneumoniae,Cough, or Pulmonary Embolism History  Cardiovascular ROS: No Chest Pain with Exertion, Palpitations, Syncope, Edema, Arrhythmia  Gastrointestinal ROS: No Indigestion, Heartburn, Nausea, vomiting, Diarrhea, Constipation,or Bowel Changes; No Bloody Stools or melena  Genito-Urinary ROS: No Dysuria, Hematuria or Nocturia. No Urinary Incontinence or Vaginal Discharge  Musculoskeletal ROS: No Arthralgia, Arthritis,Gout,Osteoporosis or Rheumatism  Neurological ROS: No CVA, Migraines, Epilepsy, Seizure Hx, or Limb Weakness  Dermatological ROS: No Rash, Itching, Hives, Mole Changes or Cancer          Blood pressure 108/64, height 5' 7\" (1.702 m), not currently breastfeeding. Chaperone for Intimate Exam  Chaperone was offered and accepted as part of the rooming process. Chaperone: NA         Abdomen: Soft non-tender; good bowel sounds. No guarding, rebound or rigidity. No CVA tenderness bilaterally. Extremities: No calf tenderness, DTR 2/4, and No edema bilaterally    Pelvic: Exam deferred. Diagnostics:  No results found. Lab Results:  Results for orders placed or performed during the hospital encounter of 01/12/17   Urine culture clean catch   Result Value Ref Range    Specimen Description       . CLEAN CATCH URINE Performed at [facility]: [address]    Specimen Description  [address] [place], [address] [place] [address] [phone]     Special Requests NOT REPORTED     Culture ESCHERICHIA COLI 10 to 50,000 CFU/ML (A)     Culture       Performed at [facility] [id number] [place] [address], [address] [place] [address] [phone]    Status FINAL 01/14/2017     Organism EC        Susceptibility    Escherichia coli - MACEY     amikacin NOT REPORTED       ampicillin <=2 SUSCEPTIBLE Sensitive      ampicillin-sulbactam NOT REPORTED       aztreonam <=1 SUSCEPTIBLE Sensitive      ceFAZolin Value in next row Sensitive       <=4 SUSCEPTIBLEInterpretive criteria when Cefazolin results are used for the therapy of uncomplicated UTIs due to E. coli, K. pneumoniae, and P. mirabilis or to predict the potential effectiveness of oral cephalosporins (eg. Cephalexin) due to E. coli, K. pneumoniae, and P. mirabilis.      cefepime Value in next row        <=4 SUSCEPTIBLEInterpretive criteria when Cefazolin results are used for the therapy of uncomplicated UTIs due to E. coli, K. pneumoniae, and P. mirabilis or to predict the potential effectiveness of oral cephalosporins (eg. Cephalexin) due to E. coli, K. pneumoniae, and P. mirabilis. cefTRIAXone Value in next row Sensitive       <=4 SUSCEPTIBLEInterpretive criteria when Cefazolin results are used for the therapy of uncomplicated UTIs due to E. coli, K. pneumoniae, and P. mirabilis or to predict the potential effectiveness of oral cephalosporins (eg. Cephalexin) due to E. coli, K. pneumoniae, and P. mirabilis. ciprofloxacin Value in next row Sensitive       <=4 SUSCEPTIBLEInterpretive criteria when Cefazolin results are used for the therapy of uncomplicated UTIs due to E. coli, K. pneumoniae, and P. mirabilis or to predict the potential effectiveness of oral cephalosporins (eg. Cephalexin) due to E. coli, K. pneumoniae, and P. mirabilis. ertapenem Value in next row        <=4 SUSCEPTIBLEInterpretive criteria when Cefazolin results are used for the therapy of uncomplicated UTIs due to E. coli, K. pneumoniae, and P. mirabilis or to predict the potential effectiveness of oral cephalosporins (eg. Cephalexin) due to E. coli, K. pneumoniae, and P. mirabilis. Confirmatory Extended Spectrum Beta-Lactamase Value in next row Sensitive       <=4 SUSCEPTIBLEInterpretive criteria when Cefazolin results are used for the therapy of uncomplicated UTIs due to E. coli, K. pneumoniae, and P. mirabilis or to predict the potential effectiveness of oral cephalosporins (eg. Cephalexin) due to E. coli, K. pneumoniae, and P. mirabilis.      gentamicin Value in next row Sensitive       <=4 SUSCEPTIBLEInterpretive criteria when Cefazolin results are used for the therapy of uncomplicated UTIs due to E. coli, K. pneumoniae, and P. mirabilis or to predict the potential effectiveness of oral cephalosporins (eg. Cephalexin) due to E. coli, K. pneumoniae, and P. mirabilis. meropenem Value in next row        <=4 SUSCEPTIBLEInterpretive criteria when Cefazolin results are used for the therapy of uncomplicated UTIs due to E. coli, K. pneumoniae, and P. mirabilis or to predict the potential effectiveness of oral cephalosporins (eg. Cephalexin) due to E. coli, K. pneumoniae, and P. mirabilis. nitrofurantoin Value in next row Sensitive       <=4 SUSCEPTIBLEInterpretive criteria when Cefazolin results are used for the therapy of uncomplicated UTIs due to E. coli, K. pneumoniae, and P. mirabilis or to predict the potential effectiveness of oral cephalosporins (eg. Cephalexin) due to E. coli, K. pneumoniae, and P. mirabilis. tigecycline Value in next row        <=4 SUSCEPTIBLEInterpretive criteria when Cefazolin results are used for the therapy of uncomplicated UTIs due to E. coli, K. pneumoniae, and P. mirabilis or to predict the potential effectiveness of oral cephalosporins (eg. Cephalexin) due to E. coli, K. pneumoniae, and P. mirabilis. tobramycin Value in next row Sensitive       <=4 SUSCEPTIBLEInterpretive criteria when Cefazolin results are used for the therapy of uncomplicated UTIs due to E. coli, K. pneumoniae, and P. mirabilis or to predict the potential effectiveness of oral cephalosporins (eg. Cephalexin) due to E. coli, K. pneumoniae, and P. mirabilis. trimethoprim-sulfamethoxazole Value in next row Sensitive       <=4 SUSCEPTIBLEInterpretive criteria when Cefazolin results are used for the therapy of uncomplicated UTIs due to E. coli, K. pneumoniae, and P. mirabilis or to predict the potential effectiveness of oral cephalosporins (eg. Cephalexin) due to E. coli, K. pneumoniae, and P. mirabilis.      piperacillin-tazobactam Value in next row Sensitive       <=4 SUSCEPTIBLEInterpretive criteria when Cefazolin results are used for the therapy of uncomplicated UTIs due to E. coli, K. pneumoniae, and P. mirabilis or to predict the potential effectiveness of oral cephalosporins (eg. Cephalexin) due to E. coli, K. pneumoniae, and P. mirabilis. UA W/REFLEX CULTURE   Result Value Ref Range    Color, UA DARK YELLOW (A) YEL    Turbidity UA CLEAR CLEAR    Glucose, Ur NEGATIVE NEG    Bilirubin Urine NEGATIVE NEG    Ketones, Urine NEGATIVE NEG    Specific Henefer, UA 1.028 1.000 - 1.030    Urine Hgb LARGE AMOUNT (A) NEG    pH, UA 6.5 5.0 - 8.0    Protein, UA 2+ (A) NEG    Urobilinogen, Urine Normal NORM    Nitrite, Urine NEGATIVE NEG    Leukocyte Esterase, Urine SMALL AMOUNT (A) NEG    Urinalysis Comments NOT REPORTED    Microscopic Urinalysis   Result Value Ref Range    -          WBC, UA 20 TO 50 /HPF    RBC, UA 20 TO 50 /HPF    Casts UA NOT REPORTED 0 - 2 /LPF    Crystals, UA NOT REPORTED NONE /HPF    Epithelial Cells UA 0 TO 2 /HPF    Renal Epithelial, UA NOT REPORTED 0 /HPF    Bacteria, UA FEW (A) NONE    Mucus, UA 1+ (A) NONE    Trichomonas, UA NOT REPORTED NONE    Amorphous, UA NOT REPORTED NONE    Other Observations UA NOT REPORTED NREQ    Yeast, UA NOT REPORTED NONE         Assessment:   Diagnosis Orders   1. Vaginal discharge        2.  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  Formatting of this note might be different from the original.  carcinoma paradid gland    Last Assessment & Plan:   Formatting of this note is different from the original.  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 She is aware that hormonal based birth control may increase her risk of developing a blood clot which may increase her morbidity and or mortality. She was counseled on alternate non hormonal based contraception options. We discussed that smoking and any hormonal based contraception may increase the patients risks of developing these life threatening blood clots. All patients are encouraged to stop smoking at the time of contraceptive counseling. Cessation programs were reviewed. The patient was instructed to use barrier contraception for sexually transmitted disease prevention. The patient was also informed of antibiotics decreasing contraceptive efficacy and the need for barrier contraception from the onset of her antibiotic dosing and through a minimum of thirty days from antibiotic cessation. The life threatening side effect profile was reviewed in detail this includes but is not limited to shortness of breath, chest pain, severe or persistent headaches, or calf pain. If any of these occur the patient has been instructed to stop using her hormonal based contraception, notify the office, and go to the emergency department or call 911. The patient denied any personal history of blood clots in her leg, lung, or heart and denied any family history of stroke, TIA, sudden cardiac death < 36 y.o.,pulmonary embolism, or deep venous thrombosis. PLAN:  Return in about 2 months (around 2/21/2023) for annual.  Continue on OCP  Rx OCP and diflucan   Repeat Annual every 1 year  Cervical Cytology Evaluation begins at 24years old. If Negative Cytology, Follow-up screening per current guidelines. Return to the office in 8 weeks. Counseled on preventative health maintenance follow-up.   No orders of the defined types were placed in this encounter. The patient, Carmen Montenegro is a 20 y.o. female, was seen with a total time spent of 20 minutes for the visit on this date of service by the E/M provider. The time component had both face to face and non face to face time spent in determining the total time component. Counseling and education regarding her diagnosis listed below and her options regarding those diagnoses were also included in determining her time component. Diagnosis Orders   1. Vaginal discharge        2. Dysmenorrhea  norethindrone-ethinyl estradiol (BLISOVI FE 1/20) 1-20 MG-MCG per tablet           The patient had her preventative health maintenance recommendations and follow-up reviewed with her at the completion of her visit.

## 2023-04-25 DIAGNOSIS — N94.6 DYSMENORRHEA: ICD-10-CM

## 2023-04-25 RX ORDER — NORETHINDRONE ACETATE AND ETHINYL ESTRADIOL 1MG-20(21)
KIT ORAL
Qty: 28 TABLET | Refills: 0 | Status: SHIPPED | OUTPATIENT
Start: 2023-04-25

## 2023-04-25 NOTE — TELEPHONE ENCOUNTER
Requesting refill on birth control Annual 05/08/23 to new Pharm in [facility].  [facility].

## 2023-05-08 ENCOUNTER — OFFICE VISIT (OUTPATIENT)
Dept: OBGYN CLINIC | Age: 23
End: 2023-05-08
Payer: COMMERCIAL

## 2023-05-08 VITALS
WEIGHT: 141 LBS | BODY MASS INDEX: 22.13 KG/M2 | DIASTOLIC BLOOD PRESSURE: 78 MMHG | SYSTOLIC BLOOD PRESSURE: 112 MMHG | HEIGHT: 67 IN

## 2023-05-08 DIAGNOSIS — N89.8 VAGINAL DISCHARGE: ICD-10-CM

## 2023-05-08 DIAGNOSIS — Z01.419 WELL FEMALE EXAM WITH ROUTINE GYNECOLOGICAL EXAM: Primary | ICD-10-CM

## 2023-05-08 DIAGNOSIS — N94.6 DYSMENORRHEA: ICD-10-CM

## 2023-05-08 PROCEDURE — 99395 PREV VISIT EST AGE 18-39: CPT | Performed by: NURSE PRACTITIONER

## 2023-05-08 RX ORDER — NORETHINDRONE ACETATE AND ETHINYL ESTRADIOL 1MG-20(21)
KIT ORAL
Qty: 28 TABLET | Refills: 12 | Status: SHIPPED | OUTPATIENT
Start: 2023-05-08

## 2023-05-08 NOTE — PROGRESS NOTES
History and Physical  [address], [address] [facility] [address], [address] [facility] [phone]   Fax [phone]  Hallie Narayan  2023              23 y.o. Chief Complaint   Patient presents with    Annual Exam       Patient's last menstrual period was 2023 (exact date). Primary Care Physician: Deborah Perry MD    The patient was seen and examined. She has no chief complaint today and is here for her annual exam.  Her bowels are regular. There are no voiding complaints. She denies any bloating. She denies vaginal discharge and was counseled on STD's and the need for barrier contraception. HPI : Hallie Narayan is a 24 y.o. female Kaz Mariah    Annual exam  Complaining of weight gain of 20 pounds in past year. Started a new job in [employer] since January. Since finished college, not as active- completed masters in December at [employer]. Concerned weight gain may be because of birth control pill. Discussed different options, including changing lower dose birth control- LoLoestrin Fe 1\/10.    Patient desires to continue on same birth control pill for now.      ________________________________________________________________________  OB History    Para Term  AB Living   0 0 0 0 0 0   SAB IAB Ectopic Molar Multiple Live Births   0 0 0 0 0 0     Past Medical History:   Diagnosis Date    Cancer ([facility] [address]     PAROTID    Exercise-induced asthma     Family history of breast cancer     mgreat aunt    Generalized anxiety disorder 2022    Seasonal allergies                                                                    Past Surgical History:   Procedure Laterality Date    NOSE SURGERY  2019    PAROTIDECTOMY Left 10/9/2014    WISDOM TOOTH EXTRACTION      WISDOM TOOTH EXTRACTION       Family History   Problem Relation Age of Onset    Coronary Art Dis Other         G. Parents    Alcohol Abuse Other         uncle, G. Parent    Heart Disease Paternal

## 2023-05-10 DIAGNOSIS — N89.8 VAGINAL DISCHARGE: ICD-10-CM

## 2023-05-10 DIAGNOSIS — Z01.419 WELL FEMALE EXAM WITH ROUTINE GYNECOLOGICAL EXAM: ICD-10-CM

## 2023-05-24 DIAGNOSIS — N94.6 DYSMENORRHEA: ICD-10-CM

## 2023-05-24 RX ORDER — NORETHINDRONE ACETATE AND ETHINYL ESTRADIOL 1MG-20(21)
KIT ORAL
Qty: 28 TABLET | Refills: 12 | Status: SHIPPED | OUTPATIENT
Start: 2023-05-24

## 2023-11-07 SDOH — HEALTH STABILITY: PHYSICAL HEALTH: ON AVERAGE, HOW MANY DAYS PER WEEK DO YOU ENGAGE IN MODERATE TO STRENUOUS EXERCISE (LIKE A BRISK WALK)?: 1 DAY

## 2023-11-07 SDOH — HEALTH STABILITY: PHYSICAL HEALTH: ON AVERAGE, HOW MANY MINUTES DO YOU ENGAGE IN EXERCISE AT THIS LEVEL?: 20 MIN

## 2023-11-08 ENCOUNTER — OFFICE VISIT (OUTPATIENT)
Dept: FAMILY MEDICINE CLINIC | Age: 23
End: 2023-11-08
Payer: COMMERCIAL

## 2023-11-08 VITALS
HEART RATE: 92 BPM | RESPIRATION RATE: 16 BRPM | HEIGHT: 67 IN | OXYGEN SATURATION: 98 % | DIASTOLIC BLOOD PRESSURE: 72 MMHG | SYSTOLIC BLOOD PRESSURE: 104 MMHG | WEIGHT: 149 LBS | BODY MASS INDEX: 23.39 KG/M2

## 2023-11-08 DIAGNOSIS — Z76.89 ENCOUNTER TO ESTABLISH CARE: ICD-10-CM

## 2023-11-08 DIAGNOSIS — E55.9 VITAMIN D DEFICIENCY: ICD-10-CM

## 2023-11-08 DIAGNOSIS — R23.9 CHANGES IN SKIN OF BOTH BREASTS: ICD-10-CM

## 2023-11-08 DIAGNOSIS — R06.83 SNORING: Primary | ICD-10-CM

## 2023-11-08 DIAGNOSIS — F41.1 GENERALIZED ANXIETY DISORDER: ICD-10-CM

## 2023-11-08 DIAGNOSIS — C80.1 MALIGNANT NEOPLASM (HCC): ICD-10-CM

## 2023-11-08 DIAGNOSIS — D50.8 IRON DEFICIENCY ANEMIA SECONDARY TO INADEQUATE DIETARY IRON INTAKE: ICD-10-CM

## 2023-11-08 PROCEDURE — 99204 OFFICE O/P NEW MOD 45 MIN: CPT | Performed by: REGISTERED NURSE

## 2023-11-08 RX ORDER — FLUTICASONE PROPIONATE 50 MCG
2 SPRAY, SUSPENSION (ML) NASAL DAILY
COMMUNITY
Start: 2023-10-13

## 2023-11-08 RX ORDER — M-VIT,TX,IRON,MINS/CALC/FOLIC 27MG-0.4MG
1 TABLET ORAL DAILY
COMMUNITY

## 2023-11-08 ASSESSMENT — PATIENT HEALTH QUESTIONNAIRE - PHQ9
1. LITTLE INTEREST OR PLEASURE IN DOING THINGS: 0
SUM OF ALL RESPONSES TO PHQ QUESTIONS 1-9: 0
2. FEELING DOWN, DEPRESSED OR HOPELESS: 0
SUM OF ALL RESPONSES TO PHQ QUESTIONS 1-9: 0
SUM OF ALL RESPONSES TO PHQ9 QUESTIONS 1 & 2: 0

## 2023-11-08 ASSESSMENT — ENCOUNTER SYMPTOMS
SHORTNESS OF BREATH: 0
EYES NEGATIVE: 1
GASTROINTESTINAL NEGATIVE: 1
COLOR CHANGE: 1

## 2023-11-08 NOTE — PROGRESS NOTES
Patient: Gail Blanchard is a 24 y.o. female who presents today with the following Chief Complaint(s):  Chief Complaint   Patient presents with    New Patient     Mole on the right side of neck, and discoloration on breasts       Assessment/Plan:    1. Changes in skin of both breasts  Changes in skin of both breasts is consistent with stretch marks. She has had an increase in her breast size of approximately 2 cups over the past 1 to 2 years. Areas of concern are smooth and she is not experiencing any other symptoms or changes of her breasts. Breast exam is normal.  Discussed signs and symptoms to monitor for and she agrees to follow-up if she notes any changes. Continue regular breast exams. 2. Snoring  She reports worsening snoring. She has had sinus surgery previously-snoring improved after this procedure. Recently her snoring has worsened. She is interested in pursuing a sleep study. Referral given. - [address], NP, Sleep Medicine, [facility] [place] [facility]    3. Malignant neoplasm ([address])  History of parotid/salivary gland cancer. She was cleared from oncology and no longer has regular follow-up. 4. Generalized anxiety disorder  Stable. Well managed with BuSpar. Follows up with psychiatrist regularly. 5. Vitamin D deficiency  Last vitamin D level from June 2023 was normal.    6. Iron deficiency anemia secondary to inadequate dietary iron intake  Last CBC and iron and ferritin levels were in normal range per labs from June 2023.    7. Encounter to establish care  Patient's past medical history, surgical history, family history, medications,  and allergies were all reviewed and updated as appropriate today. Return in about 7 months (around 6/8/2024) for Annual check up. HPI:     She is here to establish care. She has a history of     Anxiety- sees a psychiatrist. Rakesh Amos. Asthma- she had a breathing evaluation and it was determined she does not have asthma.      Cancer- parotid tumor at age

## 2024-10-03 ASSESSMENT — PATIENT HEALTH QUESTIONNAIRE - PHQ9
SUM OF ALL RESPONSES TO PHQ9 QUESTIONS 1 & 2: 0
SUM OF ALL RESPONSES TO PHQ QUESTIONS 1-9: 0
SUM OF ALL RESPONSES TO PHQ QUESTIONS 1-9: 0
2. FEELING DOWN, DEPRESSED OR HOPELESS: NOT AT ALL
1. LITTLE INTEREST OR PLEASURE IN DOING THINGS: NOT AT ALL
SUM OF ALL RESPONSES TO PHQ9 QUESTIONS 1 & 2: 0
SUM OF ALL RESPONSES TO PHQ QUESTIONS 1-9: 0
1. LITTLE INTEREST OR PLEASURE IN DOING THINGS: NOT AT ALL
2. FEELING DOWN, DEPRESSED OR HOPELESS: NOT AT ALL
SUM OF ALL RESPONSES TO PHQ QUESTIONS 1-9: 0

## 2024-10-04 ENCOUNTER — OFFICE VISIT (OUTPATIENT)
Dept: FAMILY MEDICINE CLINIC | Age: 24
End: 2024-10-04
Payer: COMMERCIAL

## 2024-10-04 VITALS
SYSTOLIC BLOOD PRESSURE: 88 MMHG | HEART RATE: 97 BPM | DIASTOLIC BLOOD PRESSURE: 68 MMHG | OXYGEN SATURATION: 98 % | WEIGHT: 148 LBS | BODY MASS INDEX: 23.17 KG/M2

## 2024-10-04 DIAGNOSIS — F41.1 GENERALIZED ANXIETY DISORDER: Primary | ICD-10-CM

## 2024-10-04 DIAGNOSIS — F42.2 MIXED OBSESSIONAL THOUGHTS AND ACTS: ICD-10-CM

## 2024-10-04 DIAGNOSIS — R19.5 LOOSE STOOLS: ICD-10-CM

## 2024-10-04 DIAGNOSIS — R11.0 NAUSEA: ICD-10-CM

## 2024-10-04 PROCEDURE — 99214 OFFICE O/P EST MOD 30 MIN: CPT | Performed by: REGISTERED NURSE

## 2024-10-04 RX ORDER — BUSPIRONE HYDROCHLORIDE 10 MG/1
10 TABLET ORAL 2 TIMES DAILY
Status: SHIPPED | COMMUNITY
Start: 2024-10-04

## 2024-10-04 RX ORDER — FLUOXETINE 10 MG/1
10 CAPSULE ORAL DAILY
Qty: 30 CAPSULE | Refills: 3 | Status: SHIPPED | OUTPATIENT
Start: 2024-10-04

## 2024-10-04 SDOH — ECONOMIC STABILITY: FOOD INSECURITY: WITHIN THE PAST 12 MONTHS, YOU WORRIED THAT YOUR FOOD WOULD RUN OUT BEFORE YOU GOT MONEY TO BUY MORE.: NEVER TRUE

## 2024-10-04 SDOH — ECONOMIC STABILITY: FOOD INSECURITY: WITHIN THE PAST 12 MONTHS, THE FOOD YOU BOUGHT JUST DIDN'T LAST AND YOU DIDN'T HAVE MONEY TO GET MORE.: NEVER TRUE

## 2024-10-04 SDOH — ECONOMIC STABILITY: INCOME INSECURITY: HOW HARD IS IT FOR YOU TO PAY FOR THE VERY BASICS LIKE FOOD, HOUSING, MEDICAL CARE, AND HEATING?: NOT HARD AT ALL

## 2024-10-04 ASSESSMENT — ENCOUNTER SYMPTOMS
VOMITING: 0
NAUSEA: 1
RESPIRATORY NEGATIVE: 1
ABDOMINAL PAIN: 0
DIARRHEA: 0
CONSTIPATION: 0
BLOOD IN STOOL: 0
SHORTNESS OF BREATH: 0

## 2024-10-04 NOTE — PROGRESS NOTES
Patient: Guicho Abel is a 24 y.o. female who presents today with the following Chief Complaint(s):  Chief Complaint   Patient presents with    Annual Exam       Assessment/Plan:    1. Generalized anxiety disorder  Suspect loose stools and nausea are symptoms of uncontrolled anxiety/OCD.  Symptoms only occur in the evenings when her anxiety is high due to her fears related to nighttime.  Plan as for her to develop an emergency plan with her boyfriend and write this down so that she has a plan for what to do in the case of an emergency.  Start progressive muscle relaxation and breathing exercises nightly.  She no longer has a psychiatrist or therapist.  Plan is to start Prozac 10 mg daily.  Continue BuSpar twice a day as needed.  Referred to behavioral health consultants for support.  Follow-up in 1 month for reevaluation and physical exam.  - FLUoxetine (PROZAC) 10 MG capsule; Take 1 capsule by mouth daily  Dispense: 30 capsule; Refill: 3    2. Mixed obsessional thoughts and acts  See note above.   - FLUoxetine (PROZAC) 10 MG capsule; Take 1 capsule by mouth daily  Dispense: 30 capsule; Refill: 3    3. Loose stools  Take probiotic daily.     4. Nausea  Suspect nausea may be related to some acid reflux.  Recommend Pepcid daily for 14 days.  Avoid any triggering foods.  Reducing anxiety should help with symptoms as well.    Return in about 4 weeks (around 11/1/2024), or if symptoms worsen or fail to improve, for Annual check up, Medication check.    HPI:     She has had an upset stomach and looser stools and some nausea for a few weeks, only in the evenings.  No abdominal pain, no blood in her stool.  No vomiting or fevers.    She has been diagnosed with OCD. She thinks this may be causing some of her symptoms. She is very anxious at night and this is when her loose stools and nausea occur.    She does not have nausea or diarrhea during the day. She feels great during the day and feels like she is able to relax in

## 2024-10-04 NOTE — PATIENT INSTRUCTIONS
Develop an emergency plan with Dav and write this down.     Progressive muscle relaxation and breathing exercises.     Pepcid nightly for 14 days.    Please make an appointment with one of our Behavioral Health Consultants, Dr. Calix, Emily Landaverde, or Hailey Pacheco. They will work with you to develop a plan to improve your overall well-being by addressing any behavioral or mental health factors that are influencing your health. You can schedule your appointment just like you schedule your other appointments here, at the  or over the phone. Each appointment will be 30 minutes long, and you will typically be seen every 2-4 weeks. Your insurance should cover the visit, but you may owe a specialist copay. If you would prefer to be seen by a therapist more frequently, or for a longer visit, please ask your Primary Care Provider for a recommendation.   Call [phone] Option 3

## 2024-11-01 ENCOUNTER — OFFICE VISIT (OUTPATIENT)
Dept: FAMILY MEDICINE CLINIC | Age: 24
End: 2024-11-01
Payer: COMMERCIAL

## 2024-11-01 VITALS
BODY MASS INDEX: 23.02 KG/M2 | OXYGEN SATURATION: 97 % | SYSTOLIC BLOOD PRESSURE: 96 MMHG | WEIGHT: 147 LBS | HEART RATE: 85 BPM | DIASTOLIC BLOOD PRESSURE: 68 MMHG

## 2024-11-01 DIAGNOSIS — F41.1 GENERALIZED ANXIETY DISORDER: Primary | ICD-10-CM

## 2024-11-01 DIAGNOSIS — Z85.818 HISTORY OF PAROTID CANCER: ICD-10-CM

## 2024-11-01 DIAGNOSIS — F42.2 MIXED OBSESSIONAL THOUGHTS AND ACTS: ICD-10-CM

## 2024-11-01 PROCEDURE — 99214 OFFICE O/P EST MOD 30 MIN: CPT | Performed by: REGISTERED NURSE

## 2024-11-01 ASSESSMENT — ENCOUNTER SYMPTOMS
RESPIRATORY NEGATIVE: 1
DIARRHEA: 0
VOMITING: 0
NAUSEA: 1

## 2024-11-01 NOTE — PATIENT INSTRUCTIONS
Plan is to start Prozac. Buspar for breakthrough anxiety.    Contact [facility] for therapy.     Schedule with ENT.

## 2024-11-01 NOTE — PROGRESS NOTES
Patient: Guicho Abel is a 24 y.o. 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 Generalized anxiety disorder  She feels that her nausea is related to her anxiety.  Discussed the benefits of medication in the setting of anxiety.  She expresses desire to start medication for her symptoms.  Provided support and encouragement for self-care.  Educated on the benefits of starting medication while she is dealing with anxiety, OCD and external stressors.  Referral to a therapist for support and skills.  Reviewed risk/benefits/potential side effects of Prozac.  She has prescription already.  Follow-up in 2 months or as needed.  - Jaymie Leong LISW-S, Counseling, [place]    2. Mixed obsessional thoughts and acts  Referred for therapy for assistance in managing OCD symptoms.  - Jaymie Leong LISW-S, Counsel[facility], [place]    3. History of parotid cancer  Referred to ENT for evaluation due to history of parotid cancer at age 14.  - Ayala Powers DO, Otolaryngology, [place]      Return in about 2 months (around 1/1/2025), or if symptoms worsen or fail to improve, for Medication check.    HPI:     She is here to follow up about her anxiety and nausea. She still thinks her nausea is due to her anxiety.     She has not started Przac because she has been waiting for things to calm down-she was traveling and then moved.  She has been waiting until things are not so busy she is concerned that if she starts medication when other things are going on she will know how effective the medication is.  She also has some concerns about what friends/family think about being on medication.    She says her boyfriend is worried that her nausea is 9 of cancer.  She had parotid cancer at the age of 14.  She believes  [Sudden] : sudden [7] : 7 [5] : 5 [Dull/Aching] : dull/aching [Localized] : localized [Constant] : constant [Household chores] : household chores [Sleep] : sleep [Rest] : rest [Sitting] : sitting [de-identified] : 5/6/22: patient fell on right TKA about a week ago and has had increased pain in the knee since. She had severe swelling and got at XR at [place]. Also fell on left wrist and presents with distal wrist pain and swelling. Has large bruise and scrape on knee as well.  \par \par Previous doc:\par Left knee and hip pain x 9 months, no specific injury. No prior left knee or hip issues. Right TKA 2014 by Dr. Byers at [facility] still with pain.\par 1/29/19: F/U MRI left knee.\par 2/26 overall better but still has pain daily with normal activity\par 4/2/19: Left knee doing well since inj. Right TKA cont pain.\par 2/4/20: Return of left knee pain, inj in Aug helped.\par 8/31/20: Inj in Feb helped for a few months, pain returning in left knee lately. Right knee unchanged.\par 4/30/21: Inj helped last summer but pain has been slowly returning.\par 10/29/21: Inj helped but pain has returned. [] : no [FreeTextEntry1] : bryan knee [FreeTextEntry5] : patient fell outside store

## 2024-11-19 ENCOUNTER — OFFICE VISIT (OUTPATIENT)
Dept: ENT CLINIC | Age: 24
End: 2024-11-19
Payer: COMMERCIAL

## 2024-11-19 VITALS
HEART RATE: 73 BPM | SYSTOLIC BLOOD PRESSURE: 115 MMHG | HEIGHT: 67 IN | WEIGHT: 147 LBS | BODY MASS INDEX: 23.07 KG/M2 | DIASTOLIC BLOOD PRESSURE: 81 MMHG

## 2024-11-19 DIAGNOSIS — R13.10 DYSPHAGIA, UNSPECIFIED TYPE: Primary | ICD-10-CM

## 2024-11-19 DIAGNOSIS — Z85.9 H/O MALIGNANT NEOPLASM: ICD-10-CM

## 2024-11-19 DIAGNOSIS — Z92.3 HISTORY OF HEAD AND NECK RADIATION: ICD-10-CM

## 2024-11-19 PROCEDURE — 99203 OFFICE O/P NEW LOW 30 MIN: CPT | Performed by: OTOLARYNGOLOGY

## 2024-11-19 ASSESSMENT — ENCOUNTER SYMPTOMS
SORE THROAT: 0
COUGH: 0
FACIAL SWELLING: 0
EYE ITCHING: 0
VOICE CHANGE: 0
APNEA: 0
SINUS PRESSURE: 0
SHORTNESS OF BREATH: 0
TROUBLE SWALLOWING: 1

## 2024-11-19 NOTE — PROGRESS NOTES
(THERAPEUTIC MULTIVITAMIN-MINERALS) tablet Take 1 tablet by mouth daily      norethindrone-ethinyl estradiol (BLISOVI FE 1/20) 1-20 MG-MCG per tablet TAKE 1 TABLET BY MOUTH DAILY 28 tablet 12    levocetirizine (XYZAL) 5 MG tablet Take 1 tablet by mouth daily      busPIRone (BUSPAR) 10 MG tablet Take 1 tablet by mouth 2 times daily (Patient not taking: Reported on 11/19/2024)      fluticasone (FLONASE) 50 MCG/ACT nasal spray 2 sprays by Each Nostril route daily Shake liquid (Patient not taking: Reported on 11/19/2024)      Probiotic Product (PROBIOTIC DAILY PO) Take by mouth (Patient not taking: Reported on 11/19/2024)      pantoprazole (PROTONIX) 20 MG tablet Take 1 tablet by mouth daily (Patient not taking: Reported on 11/19/2024)       No current facility-administered medications for this visit.       Review of Systems     Review of Systems   Constitutional:  Negative for appetite change, chills, fatigue, fever and unexpected weight change.   HENT:  Positive for postnasal drip and trouble swallowing. Negative for congestion, ear discharge, ear pain, facial swelling, hearing loss, nosebleeds, sinus pressure, sneezing, sore throat, tinnitus and voice change.    Eyes:  Negative for itching.   Respiratory:  Negative for apnea, cough and shortness of breath.    Endocrine: Negative for cold intolerance and heat intolerance.   Musculoskeletal:  Negative for myalgias and neck pain.   Skin:  Negative for rash.   Allergic/Immunologic: Negative for environmental allergies.   Neurological:  Negative for dizziness and headaches.   Psychiatric/Behavioral:  Negative for confusion, decreased concentration and sleep disturbance.          PhysicalExam     Vitals:    11/19/24 0933   BP: 115/81   Pulse: 73   Weight: 66.7 kg (147 lb)   Height: 1.702 m (5' 7\")       Physical Exam  Constitutional:       General: She is not in acute distress.     Appearance: She is well-developed.   HENT:      Head: Normocephalic and atraumatic.

## 2024-12-03 ENCOUNTER — PROCEDURE VISIT (OUTPATIENT)
Dept: SPEECH THERAPY | Age: 24
End: 2024-12-03
Payer: COMMERCIAL

## 2024-12-03 DIAGNOSIS — Z92.3 HISTORY OF HEAD AND NECK RADIATION: ICD-10-CM

## 2024-12-03 DIAGNOSIS — R13.10 DYSPHAGIA, UNSPECIFIED TYPE: Primary | ICD-10-CM

## 2024-12-03 DIAGNOSIS — K21.9 LARYNGOPHARYNGEAL REFLUX (LPR): ICD-10-CM

## 2024-12-03 PROCEDURE — 92612 ENDOSCOPY SWALLOW (FEES) VID: CPT | Performed by: SPEECH-LANGUAGE PATHOLOGIST

## 2024-12-03 PROCEDURE — 92526 ORAL FUNCTION THERAPY: CPT | Performed by: SPEECH-LANGUAGE PATHOLOGIST

## 2024-12-03 NOTE — PROGRESS NOTES
[facility] Ear, Nose, and Throat  SPEECH THERAPY  Fiberoptic Endoscopic Evaluation of Swallowing  (FEES)/Treatment      Name: Guicho Abel  YOB: 2000  Gender: female  MRN: [id number]  Referring Physician: Dr. 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 Wilson, 11/19/24:  \"History mucoepidermoid carcinoma unknown grade 10 years ago NITZA  Plan for fees\"     Patient Complaints/Reason for Referral:  Guicho Abel was referred for a FEES to assess the efficiency of his/her swallow function, assess for aspiration, and to make recommendations regarding safe dietary consistencies, effective compensatory strategies, and safe eating environment.    BACKGROUND HISTORY:   PMHx of mucoepidermoid carcinoma of L parotid gland ~10 yrs ago; treated via total parotidectomy + adjuvant XRT. Gradual progression of dysphagia w/ initial observation <5 yrs prior; characterized as sensation of bolus \"globbing up\" and/or sticking in chest, specifically w/ solids (breaded + dry items) and medications (including gel capsules). Results in chest tightness/pain at level of bolus; attempts compensations of liquid wash + coughing/regurgitation. Occasionally able to regurgitate bolus however, most often expectorates saliva/mucus. Sensation typically clears after 20-30 sec, but has had episodes last >10 min; able to breathe + talk and denied sensation of airway obstruction. Denied difficulties related to liquids and/or s/s of aspiration. Long-standing hx of allergies + sinonasal symptoms; bilateral turbinate

## 2025-01-30 ENCOUNTER — OFFICE VISIT (OUTPATIENT)
Dept: FAMILY MEDICINE CLINIC | Age: 25
End: 2025-01-30

## 2025-01-30 VITALS
WEIGHT: 147 LBS | HEART RATE: 82 BPM | SYSTOLIC BLOOD PRESSURE: 112 MMHG | OXYGEN SATURATION: 98 % | BODY MASS INDEX: 23.02 KG/M2 | DIASTOLIC BLOOD PRESSURE: 74 MMHG

## 2025-01-30 DIAGNOSIS — B36.0 TINEA VERSICOLOR: Primary | ICD-10-CM

## 2025-01-30 RX ORDER — SELENIUM SULFIDE 22.5 MG/ML
SHAMPOO TOPICAL
Qty: 180 ML | Refills: 1 | Status: SHIPPED | OUTPATIENT
Start: 2025-01-30

## 2025-01-30 SDOH — ECONOMIC STABILITY: FOOD INSECURITY: WITHIN THE PAST 12 MONTHS, THE FOOD YOU BOUGHT JUST DIDN'T LAST AND YOU DIDN'T HAVE MONEY TO GET MORE.: NEVER TRUE

## 2025-01-30 SDOH — ECONOMIC STABILITY: INCOME INSECURITY: IN THE LAST 12 MONTHS, WAS THERE A TIME WHEN YOU WERE NOT ABLE TO PAY THE MORTGAGE OR RENT ON TIME?: NO

## 2025-01-30 SDOH — ECONOMIC STABILITY: TRANSPORTATION INSECURITY
IN THE PAST 12 MONTHS, HAS THE LACK OF TRANSPORTATION KEPT YOU FROM MEDICAL APPOINTMENTS OR FROM GETTING MEDICATIONS?: NO

## 2025-01-30 SDOH — ECONOMIC STABILITY: FOOD INSECURITY: WITHIN THE PAST 12 MONTHS, YOU WORRIED THAT YOUR FOOD WOULD RUN OUT BEFORE YOU GOT MONEY TO BUY MORE.: NEVER TRUE

## 2025-01-30 SDOH — ECONOMIC STABILITY: TRANSPORTATION INSECURITY
IN THE PAST 12 MONTHS, HAS LACK OF TRANSPORTATION KEPT YOU FROM MEETINGS, WORK, OR FROM GETTING THINGS NEEDED FOR DAILY LIVING?: NO

## 2025-01-30 ASSESSMENT — PATIENT HEALTH QUESTIONNAIRE - PHQ9
1. LITTLE INTEREST OR PLEASURE IN DOING THINGS: NOT AT ALL
2. FEELING DOWN, DEPRESSED OR HOPELESS: NOT AT ALL
SUM OF ALL RESPONSES TO PHQ QUESTIONS 1-9: 0
SUM OF ALL RESPONSES TO PHQ QUESTIONS 1-9: 0
1. LITTLE INTEREST OR PLEASURE IN DOING THINGS: NOT AT ALL
SUM OF ALL RESPONSES TO PHQ QUESTIONS 1-9: 0
SUM OF ALL RESPONSES TO PHQ QUESTIONS 1-9: 0
2. FEELING DOWN, DEPRESSED OR HOPELESS: NOT AT ALL
SUM OF ALL RESPONSES TO PHQ9 QUESTIONS 1 & 2: 0
SUM OF ALL RESPONSES TO PHQ9 QUESTIONS 1 & 2: 0

## 2025-01-30 ASSESSMENT — ENCOUNTER SYMPTOMS
RESPIRATORY NEGATIVE: 1
COLOR CHANGE: 1
GASTROINTESTINAL NEGATIVE: 1

## 2025-01-30 NOTE — PROGRESS NOTES
Patient: Guicho Abel is a 25 y.o. female who presents today with the following Chief Complaint(s):  Chief Complaint   Patient presents with    Skin Problem     Near vaginal area       Assessment/Plan:    Assessment & Plan  1. Tinea versicolor.  The patient presents with a discolored spot on her vulva, which appeared about a week before [place]. The spot is not bleeding, itchy, or painful, and there is no abnormal vaginal discharge. Upon examination, the diagnosis of tinea versicolor was made. She is advised to use selenium sulfide shampoo (Head and Shoulders) to wash the affected area. She should apply the shampoo, leave it on for a few minutes, and then rinse it off. This regimen should be followed for 1 to 2 weeks until the spot resolves. Subsequently, she should continue using the shampoo a few times a week to prevent recurrence. If the condition does not improve, she should inform the clinic for a potential escalation of treatment.      1. Tinea versicolor  - Selenium Sulfide 2.25 % SHAM; Wash with shampoo daily.  Dispense: 180 mL; Refill: 1      Return if symptoms worsen or fail to improve.    HPI:     History of Present Illness  The patient is a 25-year-old female who presents for evaluation of a spot on her vulva.    She first observed the spot approximately one week prior to Patricia(a little over 1 month ago). The spot is characterized by discoloration, forming a perfect [place], without any associated bleeding or scabbing. She reports no pain or itching in the area. There has been no increase in vaginal discharge beyond her usual amount. She maintains personal hygiene through shaving. Additionally, she reports no abdominal pain and has not experienced any similar symptoms under her breasts during the summer season.        Current Outpatient Medications   Medication Sig Dispense Refill    Selenium Sulfide 2.25 % SHAM Wash with shampoo daily. 180 mL 1    Multiple Vitamins-Minerals (THERAPEUTIC

## 2025-03-26 ENCOUNTER — OFFICE VISIT (OUTPATIENT)
Dept: FAMILY MEDICINE CLINIC | Age: 25
End: 2025-03-26

## 2025-03-26 VITALS
DIASTOLIC BLOOD PRESSURE: 60 MMHG | OXYGEN SATURATION: 98 % | HEART RATE: 90 BPM | WEIGHT: 150 LBS | SYSTOLIC BLOOD PRESSURE: 98 MMHG | BODY MASS INDEX: 23.49 KG/M2

## 2025-03-26 DIAGNOSIS — F42.2 MIXED OBSESSIONAL THOUGHTS AND ACTS: ICD-10-CM

## 2025-03-26 DIAGNOSIS — N94.6 DYSMENORRHEA: Primary | ICD-10-CM

## 2025-03-26 DIAGNOSIS — F41.1 GENERALIZED ANXIETY DISORDER: ICD-10-CM

## 2025-03-26 DIAGNOSIS — Z79.899 MEDICATION MANAGEMENT: ICD-10-CM

## 2025-03-26 DIAGNOSIS — C80.1 MALIGNANT NEOPLASM (HCC): ICD-10-CM

## 2025-03-26 RX ORDER — NORETHINDRONE ACETATE AND ETHINYL ESTRADIOL 1MG-20(21)
KIT ORAL
Qty: 28 TABLET | Refills: 12 | Status: SHIPPED | OUTPATIENT
Start: 2025-03-26

## 2025-03-26 ASSESSMENT — ENCOUNTER SYMPTOMS
GASTROINTESTINAL NEGATIVE: 1
RESPIRATORY NEGATIVE: 1
EYES NEGATIVE: 1

## 2025-03-26 NOTE — PROGRESS NOTES
Patient: Guicho Abel is a 25 y.o. female who presents today with the following Chief Complaint(s):  Chief Complaint   Patient presents with    Contraception       Assessment/Plan:    Assessment & Plan  1. Contraception management.  - She has been on Blisovi for a long time and reports occasional missed periods, which is a known side effect of the medication.  - A prescription for Blisovi will be sent to [facility] in [place].  - She mentioned that [facility] provides a medication with the same makeup as Blisovi but under a different name.  - The prescription will be reordered to ensure continuity of contraception until her gynecologist appointment on 04/29/2025.    2. Anxiety.  - She plans to restart fluoxetine after her therapist appointment on 05/07/2025 to coordinate therapy and medication management.  - She experienced a lightheaded reaction previously, which she attributed to fluoxetine and external stressors.  - The potential benefits and drawbacks of using an [place] ring for anxiety management were discussed, with concerns about its impact on her OCD.  - Breathing exercises and progressive muscle relaxation techniques were recommended to help manage anxiety.    3. Medication management.  - She requested to discontinue pantoprazole, selenium sulfide, BuSpar, and Flonase as she no longer uses them.  - She will continue taking Xyzal daily.  - She expressed interest in restarting her probiotic and daily vitamin regimen.  - Her medication list will be updated accordingly.    4. Neoplasm of the parotid gland.  - She had a neoplasm of the parotid gland approximately 10 years ago.  - Recent ENT checkup indicated everything is fine.  - Annual reminders will be set to ensure ongoing monitoring of her condition.  - She will continue regular follow-ups with her ENT specialist.       1. Dysmenorrhea  - norethindrone-ethinyl estradiol (BLISOVI FE 1/20) 1-20 MG-MCG per tablet; TAKE 1 TABLET BY MOUTH DAILY  Dispense: 28 tablet;

## 2025-05-13 ENCOUNTER — OFFICE VISIT (OUTPATIENT)
Dept: FAMILY MEDICINE CLINIC | Age: 25
End: 2025-05-13

## 2025-05-13 VITALS
HEART RATE: 100 BPM | OXYGEN SATURATION: 98 % | SYSTOLIC BLOOD PRESSURE: 116 MMHG | WEIGHT: 159.2 LBS | HEIGHT: 67 IN | DIASTOLIC BLOOD PRESSURE: 64 MMHG | BODY MASS INDEX: 24.99 KG/M2

## 2025-05-13 DIAGNOSIS — R45.0 JITTERY FEELING: ICD-10-CM

## 2025-05-13 DIAGNOSIS — R53.81 MALAISE: Primary | ICD-10-CM

## 2025-05-13 DIAGNOSIS — R00.0 TACHYCARDIA: ICD-10-CM

## 2025-05-13 DIAGNOSIS — R42 DIZZINESS: ICD-10-CM

## 2025-05-13 DIAGNOSIS — L23.7 POISON IVY: ICD-10-CM

## 2025-05-13 DIAGNOSIS — N94.6 DYSMENORRHEA: ICD-10-CM

## 2025-05-13 RX ORDER — NORETHINDRONE ACETATE AND ETHINYL ESTRADIOL 1MG-20(21)
KIT ORAL
Qty: 28 TABLET | Refills: 12 | Status: SHIPPED | OUTPATIENT
Start: 2025-05-13

## 2025-05-13 ASSESSMENT — ENCOUNTER SYMPTOMS
GASTROINTESTINAL NEGATIVE: 1
EYES NEGATIVE: 1
SHORTNESS OF BREATH: 0

## 2025-05-13 NOTE — TELEPHONE ENCOUNTER
Last Office Visit  -  5/13/25  Next Office Visit  -  5/23/25    Last Filled  -  3/26/25  
[facility] is requesting a rx for norethindrone-ethinyl estradiol (BLISOVI FE 1/20) 1-20 MG-MCG per tablet .    Last OV 5/13/25    Next office visit   Visit date not found          
None

## 2025-05-13 NOTE — PROGRESS NOTES
Patient: Guicho Abel is a 25 y.o. female who presents today with the following Chief Complaint(s):  Chief Complaint   Patient presents with    Other     Not feeling well/ bug bite        Assessment/Plan:    Assessment & Plan    1. Dizziness/Malaise/Urinary tract infection.  - Symptoms include dizziness or vertigo and increased frequency of bowel movements, suspected to be due to nitrofurantoin.  - Rapid heart rate (100 bpm) could be attributed to the medication and caffeine intake.  - Encouraged her to decrease caffeine intake.  Stay hydrated.  - She will discontinue nitrofurantoin and monitor her condition. If her urinary symptoms have completely resolved, she does not need to take another dose of nitrofurantoin. If her condition does not improve, she will contact us.    2. Poison ivy.  - The lesion on her ankle appears to be consistent with poison ivy rather than a bug bite.  - Physical exam shows a small, itchy vesicle-like lesion.  - She can apply Zanfel and clear calamine lotion to alleviate the itching.    Follow-up  - The patient will follow up in 2 weeks.    1. Malaise  Suspect malaise, due to her feeling of dizziness is due to the nitrofurantoin.  She plans stop this medication.  She will follow-up in approximately 2 weeks for a physical exam and reevaluation.  She agrees to call for any issues or concerns.  See notes above.  2. Jittery feeling  3. Dizziness  4. Poison ivy  See notes above.  5. Tachycardia  Heart rate is initially 108, repeat rate is 100.  Suspect this is due to caffeine intake.    Return in about 10 days (around 5/23/2025), or if symptoms worsen or fail to improve, for Annual check up, BP check, Cholesterol.    HPI:     History of Present Illness  The patient presents for evaluation of weight management, urinary tract infection, and poison ivy.    She has been experiencing elevated heart rates, which she partially attributes to stress. She has not felt well since Friday, after she began

## 2025-05-23 ENCOUNTER — OFFICE VISIT (OUTPATIENT)
Dept: FAMILY MEDICINE CLINIC | Age: 25
End: 2025-05-23

## 2025-05-23 VITALS
WEIGHT: 158 LBS | DIASTOLIC BLOOD PRESSURE: 70 MMHG | HEART RATE: 89 BPM | HEIGHT: 67 IN | OXYGEN SATURATION: 98 % | SYSTOLIC BLOOD PRESSURE: 110 MMHG | BODY MASS INDEX: 24.8 KG/M2

## 2025-05-23 DIAGNOSIS — R30.0 DYSURIA: ICD-10-CM

## 2025-05-23 DIAGNOSIS — F42.2 MIXED OBSESSIONAL THOUGHTS AND ACTS: ICD-10-CM

## 2025-05-23 DIAGNOSIS — Z00.00 ENCOUNTER FOR WELL ADULT EXAM WITHOUT ABNORMAL FINDINGS: Primary | ICD-10-CM

## 2025-05-23 DIAGNOSIS — E55.9 VITAMIN D DEFICIENCY: ICD-10-CM

## 2025-05-23 DIAGNOSIS — Z13.1 SCREENING FOR DIABETES MELLITUS: ICD-10-CM

## 2025-05-23 DIAGNOSIS — Z13.29 SCREENING FOR THYROID DISORDER: ICD-10-CM

## 2025-05-23 DIAGNOSIS — Z13.220 SCREENING FOR HYPERLIPIDEMIA: ICD-10-CM

## 2025-05-23 DIAGNOSIS — E61.1 IRON DEFICIENCY: ICD-10-CM

## 2025-05-23 DIAGNOSIS — L23.7 POISON IVY: ICD-10-CM

## 2025-05-23 DIAGNOSIS — F41.1 GENERALIZED ANXIETY DISORDER: ICD-10-CM

## 2025-05-23 PROBLEM — C80.1 MALIGNANT NEOPLASM (HCC): Status: RESOLVED | Noted: 2022-04-06 | Resolved: 2025-05-23

## 2025-05-23 LAB
25(OH)D3 SERPL-MCNC: 21.9 NG/ML
ALBUMIN SERPL-MCNC: 4.4 G/DL (ref 3.4–5)
ALBUMIN/GLOB SERPL: 1.6 {RATIO} (ref 1.1–2.2)
ALP SERPL-CCNC: 69 U/L (ref 40–129)
ALT SERPL-CCNC: 15 U/L (ref 10–40)
ANION GAP SERPL CALCULATED.3IONS-SCNC: 12 MMOL/L (ref 3–16)
AST SERPL-CCNC: 20 U/L (ref 15–37)
BASOPHILS # BLD: 0 K/UL (ref 0–0.2)
BASOPHILS NFR BLD: 0.7 %
BILIRUB SERPL-MCNC: <0.2 MG/DL (ref 0–1)
BILIRUBIN, POC: NORMAL
BLOOD URINE, POC: NORMAL
BUN SERPL-MCNC: 8 MG/DL (ref 7–20)
CALCIUM SERPL-MCNC: 9.3 MG/DL (ref 8.3–10.6)
CHLORIDE SERPL-SCNC: 104 MMOL/L (ref 99–110)
CHOLEST SERPL-MCNC: 222 MG/DL (ref 0–199)
CLARITY, POC: NORMAL
CO2 SERPL-SCNC: 23 MMOL/L (ref 21–32)
COLOR, POC: YELLOW
CREAT SERPL-MCNC: 0.7 MG/DL (ref 0.6–1.1)
DEPRECATED RDW RBC AUTO: 12.8 % (ref 12.4–15.4)
EOSINOPHIL # BLD: 0.2 K/UL (ref 0–0.6)
EOSINOPHIL NFR BLD: 4.3 %
GFR SERPLBLD CREATININE-BSD FMLA CKD-EPI: >90 ML/MIN/{1.73_M2}
GLUCOSE SERPL-MCNC: 82 MG/DL (ref 70–99)
GLUCOSE URINE, POC: NORMAL MG/DL
HCT VFR BLD AUTO: 42.2 % (ref 36–48)
HDLC SERPL-MCNC: 65 MG/DL (ref 40–60)
HGB BLD-MCNC: 14 G/DL (ref 12–16)
KETONES, POC: NORMAL MG/DL
LDLC SERPL CALC-MCNC: 137 MG/DL
LEUKOCYTE EST, POC: NORMAL
LYMPHOCYTES # BLD: 1.4 K/UL (ref 1–5.1)
LYMPHOCYTES NFR BLD: 32.8 %
MCH RBC QN AUTO: 29.2 PG (ref 26–34)
MCHC RBC AUTO-ENTMCNC: 33.1 G/DL (ref 31–36)
MCV RBC AUTO: 88.2 FL (ref 80–100)
MONOCYTES # BLD: 0.3 K/UL (ref 0–1.3)
MONOCYTES NFR BLD: 5.7 %
NEUTROPHILS # BLD: 2.5 K/UL (ref 1.7–7.7)
NEUTROPHILS NFR BLD: 56.5 %
NITRITE, POC: NORMAL
PH, POC: 7.5
PLATELET # BLD AUTO: 305 K/UL (ref 135–450)
PMV BLD AUTO: 9.1 FL (ref 5–10.5)
POTASSIUM SERPL-SCNC: 4.5 MMOL/L (ref 3.5–5.1)
PROT SERPL-MCNC: 7.2 G/DL (ref 6.4–8.2)
PROTEIN, POC: NORMAL MG/DL
RBC # BLD AUTO: 4.78 M/UL (ref 4–5.2)
SODIUM SERPL-SCNC: 139 MMOL/L (ref 136–145)
SPECIFIC GRAVITY, POC: 1.02
TRIGL SERPL-MCNC: 98 MG/DL (ref 0–150)
TSH SERPL DL<=0.005 MIU/L-ACNC: 1.13 UIU/ML (ref 0.27–4.2)
UROBILINOGEN, POC: 0.2 MG/DL
VLDLC SERPL CALC-MCNC: 20 MG/DL
WBC # BLD AUTO: 4.4 K/UL (ref 4–11)

## 2025-05-23 ASSESSMENT — ENCOUNTER SYMPTOMS
SHORTNESS OF BREATH: 0
EYES NEGATIVE: 1
GASTROINTESTINAL NEGATIVE: 1
RESPIRATORY NEGATIVE: 1

## 2025-05-23 NOTE — PATIENT INSTRUCTIONS
Zanfel for poison ivy rash    Tecnu body wash/cleanser for preventing     Magnesium/lavender cream or CALM before bed.     \"Keep your thoughts positive because your thoughts become your words. Keep your words positive because your words become your behavior. Keep your behavior positive because your behavior becomes your habits. Keep your habits positive because your habits become your values.\"  - [place]    Well Visit, Ages 18 to 65: Care Instructions  Well visits can help you stay healthy. Your doctor has checked your overall health and may have suggested ways to take good care of yourself. Your doctor also may have recommended tests. You can help prevent illness with healthy eating, good sleep, vaccinations, regular exercise, and other steps.    Get the tests that you and your doctor decide on. Depending on your age and risks, examples might include screening for diabetes; hepatitis C; HIV; and cervical, breast, lung, and colon cancer. Screening helps find diseases before any symptoms appear.   Eat healthy foods. Choose fruits, vegetables, whole grains, lean protein, and low-fat dairy foods. Limit saturated fat and reduce salt.     Limit alcohol. Men should have no more than 2 drinks a day. Women should have no more than 1. For some people, no alcohol is the best choice.   Exercise. Get at least 30 minutes of exercise on most days of the week. Walking can be a good choice.     Reach and stay at your healthy weight. This will lower your risk for many health problems.   Take care of your mental health. Try to stay connected with friends, family, and community, and find ways to manage stress.     If you're feeling depressed or hopeless, talk to someone. A counselor can help. If you don't have a counselor, talk to your doctor.   Talk to your doctor if you think you may have a problem with alcohol or drug use. This includes prescription medicines, marijuana, and other drugs.     Avoid tobacco and nicotine: Don't smoke,

## 2025-05-23 NOTE — PROGRESS NOTES
Affect: Mood normal.         Behavior: Behavior normal.         Thought Content: Thought content normal.         Judgment: Judgment normal.            Personalized Preventive Plan  Current Health Maintenance Status  Immunization History   Administered Date(s) Administered    COVID-19, PFIZER Bivalent, DO NOT Dilute, (age 12y+), IM, 30 mcg/0.3 mL 10/24/2022    COVID-19, PFIZER GRAY top, DO NOT Dilute, (age 12 y+), IM, 30 mcg/0.3 mL 04/11/2022    COVID-19, PFIZER PURPLE top, DILUTE for use, (age 12 y+), 30mcg/0.3mL 02/25/2021, 03/18/2021, 11/18/2021    COVID-19, PFIZER, 2024/25, (age 12y+), IM, 30mcg/0.3mL 11/08/2023, 11/01/2024    Influenza Virus Vaccine 11/29/2018, 10/08/2023        Health Maintenance Due   Topic Date Due    Varicella vaccine (2 of 2 - 2-dose childhood series) 02/25/2010    HIV screen  Never done    Hepatitis C screen  Never done      Recommendations for Preventive Services Due: see orders and patient instructions/AVS.    The patient (or guardian, if applicable) and other individuals in attendance with the patient were advised that Artificial Intelligence will be utilized during this visit to record and process the conversation to generate a clinical note. The patient (or guardian, if applicable) and other individuals in attendance at the appointment consented to the use of AI, including the recording.       full weight-bearing

## 2025-05-24 LAB
BACTERIA UR CULT: NORMAL
EST. AVERAGE GLUCOSE BLD GHB EST-MCNC: 91.1 MG/DL
HBA1C MFR BLD: 4.8 %

## 2025-05-27 ENCOUNTER — RESULTS FOLLOW-UP (OUTPATIENT)
Dept: FAMILY MEDICINE CLINIC | Age: 25
End: 2025-05-27

## 2025-05-27 DIAGNOSIS — E55.9 VITAMIN D DEFICIENCY: Primary | ICD-10-CM

## 2025-06-17 ENCOUNTER — OFFICE VISIT (OUTPATIENT)
Dept: FAMILY MEDICINE CLINIC | Age: 25
End: 2025-06-17

## 2025-06-17 VITALS
BODY MASS INDEX: 24.33 KG/M2 | WEIGHT: 155 LBS | SYSTOLIC BLOOD PRESSURE: 106 MMHG | DIASTOLIC BLOOD PRESSURE: 82 MMHG | HEART RATE: 85 BPM | OXYGEN SATURATION: 98 % | HEIGHT: 67 IN

## 2025-06-17 DIAGNOSIS — M25.531 PAIN IN BOTH WRISTS: ICD-10-CM

## 2025-06-17 DIAGNOSIS — M25.532 PAIN IN BOTH WRISTS: ICD-10-CM

## 2025-06-17 DIAGNOSIS — F41.1 GENERALIZED ANXIETY DISORDER: Primary | ICD-10-CM

## 2025-06-17 DIAGNOSIS — R20.2 TINGLING OF UPPER EXTREMITY: ICD-10-CM

## 2025-06-17 RX ORDER — FLUOXETINE 10 MG/1
10 CAPSULE ORAL DAILY
Qty: 30 CAPSULE | Refills: 1 | Status: SHIPPED | OUTPATIENT
Start: 2025-06-17

## 2025-06-17 ASSESSMENT — ENCOUNTER SYMPTOMS
RESPIRATORY NEGATIVE: 1
GASTROINTESTINAL NEGATIVE: 1
BACK PAIN: 0

## 2025-06-17 NOTE — PATIENT INSTRUCTIONS
Please make an appointment with one of our Behavioral Health Consultants, Dr. Calix, Emily Landaverde, or Hailey Pacheco. They will work with you to develop a plan to improve your overall well-being by addressing any behavioral or mental health factors that are influencing your health. You can schedule your appointment just like you schedule your other appointments here, at the  or over the phone. Each appointment will be 30 minutes long, and you will typically be seen every 2-4 weeks. Your insurance should cover the visit, but you may owe a specialist copay. If you would prefer to be seen by a therapist more frequently, or for a longer visit, please ask your Primary Care Provider for a recommendation.   Call [phone] Option 3

## 2025-06-17 NOTE — PROGRESS NOTES
Patient: Guicho Abel is a 25 y.o. female who presents today with the following Chief Complaint(s):  Chief Complaint   Patient presents with    Numbness     Numbness and tingling in extremities, currently high stress pt thinks it may be anxiety/ocd related        Assessment/Plan:    Assessment & Plan  1. Anxiety.  - Reports increased stress and anxiety, particularly related to health and recent life changes.  - Unable to see therapist due to high cost per appointment.  - Prozac 10 mg will be initiated, with a prescription provided for 30 days. If a higher dose is needed after 2 weeks, she should contact the office to discuss increasing the dosage to 20 mg.  - Referral to a behavioral health specialist within the office for goal-oriented and solution-focused therapy.    2. Paresthesia.  - Experiences pins-and-needles sensations in arm and finger, possibly related to carpal tunnel syndrome.  - Increased pain and limited mobility.  - Advised to perform specific stretches and wear a brace at night.  - Referral to a hand and wrist specialist for further evaluation and potential nerve testing.    3.  Concern about diastolic reading of 82.  - Blood pressure is within normal range.    Follow-up  - Follow up in 2 months.       1. Generalized anxiety disorder  - FLUoxetine (PROZAC) 10 MG capsule; Take 1 capsule by mouth daily  Dispense: 30 capsule; Refill: 1    2. Tingling of upper extremity  - Peggy Brian MD, Hand Surgery (Hand, Wrist)[place]    3. Pain in both wrists  - Peggy Brian MD, Hand Surgery (Hand, Wrist)[place]      Return in about 2 months (around 8/17/2025), or if symptoms worsen or fail to improve, for Medication check.    HPI:     History of Present Illness  The patient presents for evaluation of anxiety and paresthesia.    She has been experiencing increased stress due to her efforts to reduce her cholesterol levels, recent dental issues, and the purchase of a new car.

## 2025-07-18 ENCOUNTER — OFFICE VISIT (OUTPATIENT)
Dept: ORTHOPEDIC SURGERY | Age: 25
End: 2025-07-18

## 2025-07-18 VITALS — BODY MASS INDEX: 24.33 KG/M2 | WEIGHT: 155 LBS | HEIGHT: 67 IN

## 2025-07-18 DIAGNOSIS — G56.22 CUBITAL TUNNEL SYNDROME ON LEFT: Primary | ICD-10-CM

## 2025-07-18 NOTE — PROGRESS NOTES
Hand, Upper Extremity and Reconstructive Surgery                Peggy Salazar MD                                             History of Present Illness  The patient is a 25-year-old right-hand dominant female presenting with wrist pain and digital paresthesia.  Patient is had wrist pain on and off since she was a child.  Is fairly mild at this time and she reports wrist pain approximately 1-2 times a month which lasts for the course of the day.  She will wear wrist braces at that time.  Her primary problem today is that she is reporting small finger numbness bilaterally.  This is started over the course the last couple months.  Is predominately in the small finger and at night.  She does states that she flexes her elbows while she is sleeping.  The numbness sometimes takes approximately 30 minutes to go away.    She reports persistent wrist discomfort, previously attributed to her small wrist size. There is no history of wrist injuries or surgical interventions. She does not     SOCIAL HISTORY  Occupation: Social media  Exercise: Cheerleading, backspot     Current Outpatient Medications   Medication Instructions    FLUoxetine (PROZAC) 10 mg, Oral, DAILY    levocetirizine (XYZAL) 5 mg, DAILY    Multiple Vitamins-Minerals (THERAPEUTIC MULTIVITAMIN-MINERALS) tablet 1 tablet, DAILY    Probiotic Product (PROBIOTIC DAILY PO) Take by mouth    Selenium Sulfide 2.25 % SHAM Wash with shampoo daily.    vitamin D (CHOLECALCIFEROL) 1,000 Units, Oral, DAILY       Past Medical History:   Diagnosis Date    Cancer (HCC)     PAROTID    Exercise-induced asthma     Family history of breast cancer     mgreat aunt    Generalized anxiety disorder 04/05/2022    OCD (obsessive compulsive disorder)     Seasonal allergies        Past Surgical History:   Procedure Laterality Date    NOSE SURGERY  12/2019    PAROTIDECTOMY Left 10/9/2014    WISDOM TOOTH EXTRACTION      WISDOM TOOTH EXTRACTION         Social

## 2025-08-19 ENCOUNTER — OFFICE VISIT (OUTPATIENT)
Dept: FAMILY MEDICINE CLINIC | Age: 25
End: 2025-08-19
Payer: COMMERCIAL

## 2025-08-19 VITALS
HEART RATE: 102 BPM | BODY MASS INDEX: 23.7 KG/M2 | SYSTOLIC BLOOD PRESSURE: 122 MMHG | WEIGHT: 151 LBS | OXYGEN SATURATION: 97 % | DIASTOLIC BLOOD PRESSURE: 66 MMHG | HEIGHT: 67 IN

## 2025-08-19 DIAGNOSIS — M79.676 PAIN OF FIFTH TOE: Primary | ICD-10-CM

## 2025-08-19 PROCEDURE — 99213 OFFICE O/P EST LOW 20 MIN: CPT | Performed by: NURSE PRACTITIONER

## 2025-08-19 ASSESSMENT — ENCOUNTER SYMPTOMS
GASTROINTESTINAL NEGATIVE: 1
RESPIRATORY NEGATIVE: 1